# Patient Record
Sex: FEMALE | Race: WHITE | URBAN - METROPOLITAN AREA
[De-identification: names, ages, dates, MRNs, and addresses within clinical notes are randomized per-mention and may not be internally consistent; named-entity substitution may affect disease eponyms.]

---

## 2020-06-01 ENCOUNTER — OFFICE VISIT (OUTPATIENT)
Dept: URBAN - METROPOLITAN AREA CLINIC 98 | Facility: CLINIC | Age: 40
End: 2020-06-01

## 2020-06-22 ENCOUNTER — TELEPHONE ENCOUNTER (OUTPATIENT)
Dept: URBAN - METROPOLITAN AREA CLINIC 92 | Facility: CLINIC | Age: 40
End: 2020-06-22

## 2020-06-22 ENCOUNTER — OFFICE VISIT (OUTPATIENT)
Dept: URBAN - METROPOLITAN AREA CLINIC 98 | Facility: CLINIC | Age: 40
End: 2020-06-22
Payer: COMMERCIAL

## 2020-06-22 DIAGNOSIS — R19.7 DIARRHEA: ICD-10-CM

## 2020-06-22 DIAGNOSIS — K92.1 HEMATOCHEZIA: ICD-10-CM

## 2020-06-22 DIAGNOSIS — K50.80 CROHN'S COLITIS: ICD-10-CM

## 2020-06-22 DIAGNOSIS — K62.89 ANAL PAIN: ICD-10-CM

## 2020-06-22 PROCEDURE — 82306 VITAMIN D 25 HYDROXY: CPT | Performed by: INTERNAL MEDICINE

## 2020-06-22 PROCEDURE — 80299 QUANTITATIVE ASSAY DRUG: CPT | Performed by: INTERNAL MEDICINE

## 2020-06-22 PROCEDURE — 82397 CHEMILUMINESCENT ASSAY: CPT | Performed by: INTERNAL MEDICINE

## 2020-06-22 PROCEDURE — 99215 OFFICE O/P EST HI 40 MIN: CPT | Performed by: INTERNAL MEDICINE

## 2020-06-22 RX ORDER — USTEKINUMAB 90 MG/ML: AS DIRECTED INJECTION, SOLUTION SUBCUTANEOUS

## 2020-06-22 RX ORDER — OMEPRAZOLE 40 MG/1
TAKE 1 CAPSULE (40 MG) BY ORAL ROUTE ONCE DAILY BEFORE A MEAL FOR 90 DAYS CAPSULE, DELAYED RELEASE PELLETS ORAL 1
Qty: 90 | Refills: 3 | Status: ACTIVE | COMMUNITY
Start: 2019-12-02 | End: 2020-11-26

## 2020-06-22 RX ORDER — USTEKINUMAB 90 MG/ML
AS DIRECTED INJECTION, SOLUTION SUBCUTANEOUS
Status: ACTIVE | COMMUNITY

## 2020-06-22 RX ORDER — METHOTREXATE SODIUM 25 MG/ML
INJECT 0.4 MILLILITER ONCE A WEEK FOR 28 DAYS INJECTION, SOLUTION INTRA-ARTERIAL; INTRAMUSCULAR; INTRAVENOUS
Qty: 4 | Refills: 5 | Status: ACTIVE | COMMUNITY
Start: 2020-02-21

## 2020-06-22 RX ORDER — METRONIDAZOLE 500 MG/1
1 TABLET TABLET, FILM COATED ORAL
Qty: 28 TABLET | OUTPATIENT
Start: 2020-06-22 | End: 2020-07-06

## 2020-06-22 RX ORDER — BUDESONIDE 3 MG/1
3 CAPSULES CAPSULE ORAL ONCE A DAY
Status: ACTIVE | COMMUNITY

## 2020-06-22 RX ORDER — BUDESONIDE 3 MG/1: 3 CAPSULES CAPSULE ORAL ONCE A DAY

## 2020-06-22 NOTE — HPI-TODAY'S VISIT:
40 yo female with Crohn's disease comes in for face to face 6 month f/u.  Doing the same as Dec 2.  Tapered off Entocort but is back on  6 mg a day.  Started Stelara Sept 2019. Last dose of Cimzia was July 2019 Colonoscopy 7/15/19 and this showed ulceratioon in multiple areas of the colon.  Currently 5 stools a day, pre- Stelara it was 8 and so there is improvement. Urgency is better. Visible pain is gone.  Soaking in a tub and sitz bath and using hemorrhoid, preparation H.  Might try 2 week course of flagyl 500 bid. Culturelle once a day.  Stelara 90 q 8 weeks Mtx .6 q week folic acid 1 mg  Entocort 2 per day. Omeprazole 40 mg a day Culturelle Psotiasis has resolved with Stelara.  12/2/19  Plan 12/2/19 omeprazole 40 mg oral capsule,delayed release(DR/EC)  SIG: take 1 capsule (40 mg) by oral route once daily before a meal for 90 days  DISP: (90) capsules with 3 refills Prescribed on 12/02/2019    Dexilant 60 mg oral capsule,biphase delayed releas  SIG: take 1 capsule (60 mg) by oral route once daily  DISP: (90) capsules with 1 refills Discontinued on 12/02/2019    Medications have been Reconciled  Transition of Care or Provider Policy  InstructionsI have documented a list of current medications and reviewed it with the patient.  I encouraged the patient to diet and exercise.  Electronically Identified Patient Education Materials Provided Electronically  DispositionReturn To Clinic in 6 Months  CorrespondenceCC this document (Alize Valente MD) - 12/2/2019    3 days of xifaxan given for [place] travel.  Start tapering budesonide from 9 to 6 to 3 mg q month change.         12/2/19 HPI Follow-up Crohn's Disease    History Of Present Illness  This is a scheduled appointment for this patient, a 38 year old /White female, after a previous visit approximately Crohn's disease.     39 yo female with CD and recent Cimzia comes in for f/u.  Doing well.  Stooling is more formed. Rare blood in stool.  Big improvement.  Psoriasis is good.  Soreness around bottom. Has used hemorrhoidal cream. Feels irritated    == 8/26/19  39 yo female Stelara infusion 8/5 Less bleeding and for the first week, stools were more formed but now looser and more tarry. Not sure why.  Not on an acid blocker, never was.  No upper pain, but does have lower spasms that is the same as pre-STelara. Urgency is the same.   ====== 6/24/19   39 yo female with CD last seen 5/15 and last had Cimzia 4 days ago, 6/20 and is taking 2 injections -- 400 mg every 2 weeks.  3 loose and 1 formed stool.  Painful. Formed stool causes lower abd pain. Rectal bleeding with every stool. Low energy  Been on prednisone 25 a day for 2 months.  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  Psoriasis was dx and confirmed by Dr. Buchanan and she did cortisone injection and is just a nidus on head.  Left knee aches.  Discussed options such as Humira, Remicade and Stelara.  Prefers Budesonide over Prednisone.  5/15/19 labs on pred  showed "indeterminate" quantiferon, that we need to recheck.   ==== 5/15/19  39 yo female with CD dx 2007 (initally thought to be UC comes in for 6 month f/u).  Beem pm pred for 5 weeks.  4-5 bloody stools a day.  Urgency.  Never had c diff.  Psotiasis or scaly area on scalp for 4 years, and got better with Cimzia, which was started in 2015.  Cimzia helped CD also, and has escalated to 400 mg q 2 weeks. Mtx .8cc q week. 25mg predniosone   Last dose Cimzia was Monday May 6.  Needs 8 week wash for Julia study which we discussed.  Also stress as she is having divorce in progress.  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      Past Medical History  Disease Name Date Onset Notes  Abdominal pain, generalized 06/24/2019 --   Anal pain 12/02/2019 --   Black tarry stools 08/26/2019 --   Crohn disease unk --   Diarrhea 11/28/2018 --   Hematochezia 05/15/2019 --   Influenza Immunization unk --   Psoriasis 11/15/2018 --       Past Surgical History  Procedure Name Date Notes  Colonoscopy 5/2018 12/02/2019 - 05/15/2019 - 11/28/2018 - 11/15/2018 - 04/11/2018 - 06/17/2016 - 05/06/2016 - 04/11/2016 -   EGD 8/2014 12/02/2019 - 05/15/2019 - 11/28/2018 - 11/15/2018 - 04/11/2018 - 06/17/2016 - 05/06/2016 - 04/11/2016 -       Medication List  Name Date Started Instructions  Dexilant 60 mg oral capsule,biphase delayed releas  take 1 capsule (60 mg) by oral route once daily  Entocort EC 3 mg oral capsule,delayed,extend.release 06/24/2019 take 3 capsules (9 mg) by oral route once daily in the morning for up to 8 weeks for 30 days  folic acid oral  --   methotrexate sodium 25 mg/mL injection solution 11/28/2018 inject 1 milliliter once a week for 28 days  Stelara 90 mg/mL subcutaneous syringe 06/24/2019 inject 1 milliliter (90 mg) by subcutaneous route every 8 weeks for 60 days      Allergy List  Allergen Name Date Reaction Notes  No Known Environmental Allergies --  --  05/06/2016 - 04/11/2016 -   Dairy allergy --  --  05/06/2016 - 04/11/2016 -   mercaptopurine --  --  --   other --  --  Mesalamine based drugs      Family Medical History  Disease Name Relative/Age Notes  Family history of colon polyps Mother/45  5/6/2016  Family history of breast cancer Mother/46  5/6/2016      Social History

## 2020-07-01 LAB
A/G RATIO: 1.3
ALBUMIN: 4
ALKALINE PHOSPHATASE: 69
ALT (SGPT): 13
ANTI-USTEKINUMAB ANTIBODY: <40
AST (SGOT): 13
BASO (ABSOLUTE): 0.1
BASOS: 1
BILIRUBIN, TOTAL: <0.2
BUN/CREATININE RATIO: 11
BUN: 10
C-REACTIVE PROTEIN, QUANT: 19
CALCIUM: 9.3
CARBON DIOXIDE, TOTAL: 19
CHLORIDE: 104
CREATININE: 0.89
EGFR IF AFRICN AM: 94
EGFR IF NONAFRICN AM: 81
EOS (ABSOLUTE): 0.3
EOS: 2
FERRITIN, SERUM: 8
GLOBULIN, TOTAL: 3
GLUCOSE: 102
HEMATOCRIT: 36.4
HEMATOLOGY COMMENTS:: (no result)
HEMOGLOBIN: 11.1
IMMATURE CELLS: (no result)
IMMATURE GRANS (ABS): 0
IMMATURE GRANULOCYTES: 0
IRON BIND.CAP.(TIBC): 406
IRON SATURATION: 12
IRON: 48
LYMPHS (ABSOLUTE): 1.7
LYMPHS: 14
MCH: 26.1
MCHC: 30.5
MCV: 86
MONOCYTES(ABSOLUTE): 0.8
MONOCYTES: 7
NEUTROPHILS (ABSOLUTE): 9.1
NEUTROPHILS: 76
NRBC: (no result)
PLATELETS: 609
POTASSIUM: 4.9
PROTEIN, TOTAL: 7
RBC: 4.25
RDW: 13.8
SEDIMENTATION RATE-WESTERGREN: 33
SODIUM: 144
UIBC: 358
USTEKINUMAB: 3.9
VITAMIN D, 25-HYDROXY: 43.8
WBC: 12

## 2020-08-24 ENCOUNTER — OFFICE VISIT (OUTPATIENT)
Dept: URBAN - METROPOLITAN AREA CLINIC 98 | Facility: CLINIC | Age: 40
End: 2020-08-24
Payer: COMMERCIAL

## 2020-08-24 DIAGNOSIS — K92.1 HEMATOCHEZIA: ICD-10-CM

## 2020-08-24 DIAGNOSIS — R19.7 DIARRHEA: ICD-10-CM

## 2020-08-24 DIAGNOSIS — K50.80 CROHN'S COLITIS: ICD-10-CM

## 2020-08-24 DIAGNOSIS — K62.89 ANAL PAIN: ICD-10-CM

## 2020-08-24 PROCEDURE — 99215 OFFICE O/P EST HI 40 MIN: CPT | Performed by: INTERNAL MEDICINE

## 2020-08-24 RX ORDER — BUDESONIDE 3 MG/1
3 CAPSULES CAPSULE ORAL ONCE A DAY
Status: ACTIVE | COMMUNITY

## 2020-08-24 RX ORDER — USTEKINUMAB 90 MG/ML
AS DIRECTED INJECTION, SOLUTION SUBCUTANEOUS
Status: ACTIVE | COMMUNITY

## 2020-08-24 RX ORDER — METHOTREXATE SODIUM 25 MG/ML
INJECT 0.4 MILLILITER ONCE A WEEK FOR 28 DAYS INJECTION, SOLUTION INTRA-ARTERIAL; INTRAMUSCULAR; INTRAVENOUS
Qty: 4 | Refills: 5 | Status: ACTIVE | COMMUNITY
Start: 2020-02-21

## 2020-08-24 RX ORDER — OMEPRAZOLE 40 MG/1
TAKE 1 CAPSULE (40 MG) BY ORAL ROUTE ONCE DAILY BEFORE A MEAL FOR 90 DAYS CAPSULE, DELAYED RELEASE PELLETS ORAL 1
Qty: 90 | Refills: 3 | Status: ACTIVE | COMMUNITY
Start: 2019-12-02 | End: 2020-11-26

## 2020-08-24 NOTE — HPI-TODAY'S VISIT:
38 yo female with Crohn's disease for telehealth.  Next  4-5 stools a day. Occasional blood in stool Gets pain 2-3 times a week or more Fatigue No joint aches  Psoriasis is inactive.   ==============================  6/22/20  38 yo female with Crohn's disease comes in for face to face 6 month f/u.  Doing the same as Dec 2.  Tapered off Entocort but is back on  6 mg a day.  Started Stelara Sept 2019. Last dose of Cimzia was July 2019 Colonoscopy 7/15/19 and this showed ulceratioon in multiple areas of the colon.  Currently 5 stools a day, pre- Stelara it was 8 and so there is improvement. Urgency is better. Visible blood is gone.  Soaking in a tub and sitz bath and using hemorrhoid, preparation H.  Might try 2 week course of flagyl 500 bid. Culturelle once a day.  Stelara 90 q 8 weeks Mtx .6 q week folic acid 1 mg  Entocort 2 per day. Omeprazole 40 mg a day Culturelle Psotiasis has resolved with Stelara. ================================================================= 12/2/19  Plan 12/2/19 omeprazole 40 mg oral capsule,delayed release(DR/EC)  SIG: take 1 capsule (40 mg) by oral route once daily before a meal for 90 days  DISP: (90) capsules with 3 refills Prescribed on 12/02/2019    Dexilant 60 mg oral capsule,biphase delayed releas  SIG: take 1 capsule (60 mg) by oral route once daily  DISP: (90) capsules with 1 refills Discontinued on 12/02/2019    Medications have been Reconciled  Transition of Care or Provider Policy  InstructionsI have documented a list of current medications and reviewed it with the patient.  I encouraged the patient to diet and exercise.  Electronically Identified Patient Education Materials Provided Electronically  DispositionReturn To Clinic in 6 Months  CorrespondenceCC this document (Alize Valente MD) - 12/2/2019    3 days of xifaxan given for [place] travel.  Start tapering budesonide from 9 to 6 to 3 mg q month change.         12/2/19 HPI Follow-up Crohn's Disease    History Of Present Illness  This is a scheduled appointment for this patient, a 38 year old /White female, after a previous visit approximately Crohn's disease.     37 yo female with CD and recent Cimzia comes in for f/u.  Doing well.  Stooling is more formed. Rare blood in stool.  Big improvement.  Psoriasis is good.  Soreness around bottom. Has used hemorrhoidal cream. Feels irritated    == 8/26/19  37 yo female Stelara infusion 8/5 Less bleeding and for the first week, stools were more formed but now looser and more tarry. Not sure why.  Not on an acid blocker, never was.  No upper pain, but does have lower spasms that is the same as pre-STelara. Urgency is the same.   ====== 6/24/19   37 yo female with CD last seen 5/15 and last had Cimzia 4 days ago, 6/20 and is taking 2 injections -- 400 mg every 2 weeks.  3 loose and 1 formed stool.  Painful. Formed stool causes lower abd pain. Rectal bleeding with every stool. Low energy  Been on prednisone 25 a day for 2 months.  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  Psoriasis was dx and confirmed by Dr. Buchanan and she did cortisone injection and is just a nidus on head.  Left knee aches.  Discussed options such as Humira, Remicade and Stelara.  Prefers Budesonide over Prednisone.  5/15/19 labs on pred  showed "indeterminate" quantiferon, that we need to recheck.   ==== 5/15/19  37 yo female with CD dx 2007 (initally thought to be UC comes in for 6 month f/u).  Beem pm pred for 5 weeks.  4-5 bloody stools a day.  Urgency.  Never had c diff.  Psotiasis or scaly area on scalp for 4 years, and got better with Cimzia, which was started in 2015.  Cimzia helped CD also, and has escalated to 400 mg q 2 weeks. Mtx .8cc q week. 25mg predniosone   Last dose Cimzia was Monday May 6.  Needs 8 week wash for Julia study which we discussed.  Also stress as she is having divorce in progress.  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      Past Medical History  Disease Name Date Onset Notes  Abdominal pain, generalized 06/24/2019 --   Anal pain 12/02/2019 --   Black tarry stools 08/26/2019 --   Crohn disease unk --   Diarrhea 11/28/2018 --   Hematochezia 05/15/2019 --   Influenza Immunization unk --   Psoriasis 11/15/2018 --       Past Surgical History  Procedure Name Date Notes  Colonoscopy 5/2018 12/02/2019 - 05/15/2019 - 11/28/2018 - 11/15/2018 - 04/11/2018 - 06/17/2016 - 05/06/2016 - 04/11/2016 -   EGD 8/2014 12/02/2019 - 05/15/2019 - 11/28/2018 - 11/15/2018 - 04/11/2018 - 06/17/2016 - 05/06/2016 - 04/11/2016 -       Medication List  Name Date Started Instructions  Dexilant 60 mg oral capsule,biphase delayed releas  take 1 capsule (60 mg) by oral route once daily  Entocort EC 3 mg oral capsule,delayed,extend.release 06/24/2019 take 3 capsules (9 mg) by oral route once daily in the morning for up to 8 weeks for 30 days  folic acid oral  --   methotrexate sodium 25 mg/mL injection solution 11/28/2018 inject 1 milliliter once a week for 28 days  Stelara 90 mg/mL subcutaneous syringe 06/24/2019 inject 1 milliliter (90 mg) by subcutaneous route every 8 weeks for 60 days      Allergy List  Allergen Name Date Reaction Notes  No Known Environmental Allergies --  --  05/06/2016 - 04/11/2016 -   Dairy allergy --  --  05/06/2016 - 04/11/2016 -   mercaptopurine --  --  --   other --  --  Mesalamine based drugs      Family Medical History  Disease Name Relative/Age Notes  Family history of colon polyps Mother/45  5/6/2016  Family history of breast cancer Mother/46  5/6/2016      Social History

## 2020-09-04 ENCOUNTER — ERX REFILL RESPONSE (OUTPATIENT)
Age: 40
End: 2020-09-04

## 2020-09-04 RX ORDER — METHOTREXATE 25 MG/ML
INJECT 0.4 MILLILITER ONCE A WEEK FOR 28 DAYS INJECTION INTRA-ARTERIAL; INTRAMUSCULAR; INTRATHECAL; INTRAVENOUS
Qty: 8 | Refills: 2

## 2020-09-26 ENCOUNTER — ERX REFILL RESPONSE (OUTPATIENT)
Age: 40
End: 2020-09-26

## 2020-09-26 RX ORDER — FOLIC ACID 1 MG/1
TAKE 5 TABLETS BY MOUTH ONCE WEEKLY TABLET ORAL
Qty: 60 | Refills: 3

## 2020-09-26 RX ORDER — BUDESONIDE 3 MG/1
TAKE 3 CAPSULES BY MOUTH IN THE MORNING CAPSULE, GELATIN COATED ORAL
Qty: 270 | Refills: 2

## 2020-10-21 ENCOUNTER — TELEPHONE ENCOUNTER (OUTPATIENT)
Dept: URBAN - METROPOLITAN AREA CLINIC 98 | Facility: CLINIC | Age: 40
End: 2020-10-21

## 2020-10-21 RX ORDER — USTEKINUMAB 90 MG/ML
INJECT 90 MG INJECTION, SOLUTION SUBCUTANEOUS
Qty: 1 SYRINGE | Refills: 6 | OUTPATIENT
Start: 2020-10-21 | End: 2021-12-14

## 2020-12-24 ENCOUNTER — ERX REFILL RESPONSE (OUTPATIENT)
Age: 40
End: 2020-12-24

## 2020-12-24 RX ORDER — OMEPRAZOLE 40 MG/1
TAKE 1 CAPSULE BY MOUTH EVERY DAY BEFORE A MEAL CAPSULE, DELAYED RELEASE ORAL
Qty: 90 | Refills: 3

## 2021-03-15 ENCOUNTER — TELEPHONE ENCOUNTER (OUTPATIENT)
Dept: URBAN - METROPOLITAN AREA SURGERY CENTER 30 | Facility: SURGERY CENTER | Age: 41
End: 2021-03-15

## 2021-05-08 ENCOUNTER — ERX REFILL RESPONSE (OUTPATIENT)
Dept: URBAN - METROPOLITAN AREA CLINIC 98 | Facility: CLINIC | Age: 41
End: 2021-05-08

## 2021-05-08 RX ORDER — METHOTREXATE 25 MG/ML
INJECT 0.4 MILLILITER ONCE A WEEK FOR 28 DAYS INJECTION INTRA-ARTERIAL; INTRAMUSCULAR; INTRATHECAL; INTRAVENOUS
Qty: 8 | Refills: 2

## 2021-06-28 ENCOUNTER — OFFICE VISIT (OUTPATIENT)
Dept: URBAN - METROPOLITAN AREA CLINIC 98 | Facility: CLINIC | Age: 41
End: 2021-06-28
Payer: COMMERCIAL

## 2021-06-28 DIAGNOSIS — L40.9 PSORIASIS: ICD-10-CM

## 2021-06-28 DIAGNOSIS — K50.80 CROHN'S COLITIS: ICD-10-CM

## 2021-06-28 DIAGNOSIS — K92.1 HEMATOCHEZIA: ICD-10-CM

## 2021-06-28 DIAGNOSIS — R10.84 ABDOMINAL PAIN, GENERALIZED: ICD-10-CM

## 2021-06-28 PROCEDURE — 99214 OFFICE O/P EST MOD 30 MIN: CPT | Performed by: INTERNAL MEDICINE

## 2021-06-28 RX ORDER — FOLIC ACID 1 MG/1
TAKE 5 TABLETS BY MOUTH ONCE WEEKLY TABLET ORAL
Qty: 60 | Refills: 3 | Status: ACTIVE | COMMUNITY

## 2021-06-28 RX ORDER — USTEKINUMAB 90 MG/ML
INJECT 90 MG INJECTION, SOLUTION SUBCUTANEOUS
Qty: 1 SYRINGE | Refills: 6 | Status: ACTIVE | COMMUNITY
Start: 2020-10-21 | End: 2021-12-14

## 2021-06-28 RX ORDER — METHOTREXATE SODIUM 25 MG/ML
INJECT 0.4 MILLILITER ONCE A WEEK FOR 28 DAYS INJECTION, SOLUTION INTRA-ARTERIAL; INTRAMUSCULAR; INTRAVENOUS
Qty: 4 | Refills: 5 | Status: ACTIVE | COMMUNITY
Start: 2020-02-21

## 2021-06-28 RX ORDER — OMEPRAZOLE 40 MG/1
TAKE 1 CAPSULE BY MOUTH EVERY DAY BEFORE A MEAL CAPSULE, DELAYED RELEASE ORAL
Qty: 90 | Refills: 3 | Status: ACTIVE | COMMUNITY

## 2021-06-28 RX ORDER — BUDESONIDE 3 MG/1
TAKE 3 CAPSULES BY MOUTH IN THE MORNING CAPSULE, GELATIN COATED ORAL
Qty: 270 | Refills: 2 | Status: ACTIVE | COMMUNITY

## 2021-06-28 RX ORDER — METHOTREXATE 25 MG/ML
INJECT 0.4 MILLILITER ONCE A WEEK FOR 28 DAYS INJECTION INTRA-ARTERIAL; INTRAMUSCULAR; INTRATHECAL; INTRAVENOUS
Qty: 8 | Refills: 2 | Status: ACTIVE | COMMUNITY

## 2021-06-28 RX ORDER — USTEKINUMAB 90 MG/ML
AS DIRECTED INJECTION, SOLUTION SUBCUTANEOUS
Status: ACTIVE | COMMUNITY

## 2021-06-28 RX ORDER — BUDESONIDE 3 MG/1
3 CAPSULES CAPSULE ORAL ONCE A DAY
Status: ACTIVE | COMMUNITY

## 2021-06-28 NOTE — HPI-TODAY'S VISIT:
39 yo female for CD f/u. 10 months. Been on Stelara 1.5 years or so.  Doing really well on Stelara.  No diarrhea. No pain. Psoriasis almost inactive. No topical. Occasional blood in stool twice a month.  No other medical issues. Last colonoscopy- summer of 2019. CD duration ----- 2017. Need colonoscopy  in next 4 months. Is taking omeprazole 40 mg a day for heartburn.  Stelara 90 q 8 Mtx .6 cc or 12.5 mg q week Folic acid 1mg 5 per week Omeprazole 40 mg q am.    ============================ 8/24/20  40 yo female with Crohn's disease for telehealth.  Next  4-5 stools a day. Occasional blood in stool Gets pain 2-3 times a week or more Fatigue No joint aches  Psoriasis is inactive. Stelara - tx.   ==============================  6/22/20  40 yo female with Crohn's disease comes in for face to face 6 month f/u.  Doing the same as Dec 2.  Tapered off Entocort but is back on  6 mg a day.  Started Stelara Sept 2019. Last dose of Cimzia was July 2019 Colonoscopy 7/15/19 and this showed ulceratioon in multiple areas of the colon.  Currently 5 stools a day, pre- Stelara it was 8 and so there is improvement. Urgency is better. Visible blood is gone.  Soaking in a tub and sitz bath and using hemorrhoid, preparation H.  Might try 2 week course of flagyl 500 bid. Culturelle once a day.  Stelara 90 q 8 weeks Mtx .6 q week folic acid 1 mg  Entocort 2 per day. Omeprazole 40 mg a day Culturelle Psotiasis has resolved with Stelara. ================================================================= 12/2/19  Plan 12/2/19 omeprazole 40 mg oral capsule,delayed release(DR/EC)  SIG: take 1 capsule (40 mg) by oral route once daily before a meal for 90 days  DISP: (90) capsules with 3 refills Prescribed on 12/02/2019    Dexilant 60 mg oral capsule,biphase delayed releas  SIG: take 1 capsule (60 mg) by oral route once daily  DISP: (90) capsules with 1 refills Discontinued on 12/02/2019    Medications have been Reconciled  Transition of Care or Provider Policy  InstructionsI have documented a list of current medications and reviewed it with the patient.  I encouraged the patient to diet and exercise.  Electronically Identified Patient Education Materials Provided Electronically  DispositionReturn To Clinic in 6 Months  CorrespondenceCC this document (Alize Valente MD) - 12/2/2019    3 days of xifaxan given for [place] travel.  Start tapering budesonide from 9 to 6 to 3 mg q month change.         12/2/19 HPI Follow-up Crohn's Disease    History Of Present Illness  This is a scheduled appointment for this patient, a 38 year old /White female, after a previous visit approximately Crohn's disease.     37 yo female with CD and recent Cimzia comes in for f/u.  Doing well.  Stooling is more formed. Rare blood in stool.  Big improvement.  Psoriasis is good.  Soreness around bottom. Has used hemorrhoidal cream. Feels irritated    == 8/26/19  37 yo female Stelara infusion 8/5 Less bleeding and for the first week, stools were more formed but now looser and more tarry. Not sure why.  Not on an acid blocker, never was.  No upper pain, but does have lower spasms that is the same as pre-STelara. Urgency is the same.   ====== 6/24/19   37 yo female with CD last seen 5/15 and last had Cimzia 4 days ago, 6/20 and is taking 2 injections -- 400 mg every 2 weeks.  3 loose and 1 formed stool.  Painful. Formed stool causes lower abd pain. Rectal bleeding with every stool. Low energy  Been on prednisone 25 a day for 2 months.  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  Psoriasis was dx and confirmed by Dr. Buchanan and she did cortisone injection and is just a nidus on head.  Left knee aches.  Discussed options such as Humira, Remicade and Stelara.  Prefers Budesonide over Prednisone.  5/15/19 labs on pred  showed "indeterminate" quantiferon, that we need to recheck.   ==== 5/15/19  37 yo female with CD dx 2007 (initally thought to be UC comes in for 6 month f/u).  Beem pm pred for 5 weeks.  4-5 bloody stools a day.  Urgency.  Never had c diff.  Psotiasis or scaly area on scalp for 4 years, and got better with Cimzia, which was started in 2015.  Cimzia helped CD also, and has escalated to 400 mg q 2 weeks. Mtx .8cc q week. 25mg predniosone   Last dose Cimzia was Monday May 6.  Needs 8 week wash for Julia study which we discussed.  Also stress as she is having divorce in progress.  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      Past Medical History  Disease Name Date Onset Notes  Abdominal pain, generalized 06/24/2019 --   Anal pain 12/02/2019 --   Black tarry stools 08/26/2019 --   Crohn disease unk --   Diarrhea 11/28/2018 --   Hematochezia 05/15/2019 --   Influenza Immunization unk --   Psoriasis 11/15/2018 --       Past Surgical History  Procedure Name Date Notes  Colonoscopy 5/2018 12/02/2019 - 05/15/2019 - 11/28/2018 - 11/15/2018 - 04/11/2018 - 06/17/2016 - 05/06/2016 - 04/11/2016 -   EGD 8/2014 12/02/2019 - 05/15/2019 - 11/28/2018 - 11/15/2018 - 04/11/2018 - 06/17/2016 - 05/06/2016 - 04/11/2016 -       Medication List  Name Date Started Instructions  Dexilant 60 mg oral capsule,biphase delayed releas  take 1 capsule (60 mg) by oral route once daily  Entocort EC 3 mg oral capsule,delayed,extend.release 06/24/2019 take 3 capsules (9 mg) by oral route once daily in the morning for up to 8 weeks for 30 days  folic acid oral  --   methotrexate sodium 25 mg/mL injection solution 11/28/2018 inject 1 milliliter once a week for 28 days  Stelara 90 mg/mL subcutaneous syringe 06/24/2019 inject 1 milliliter (90 mg) by subcutaneous route every 8 weeks for 60 days      Allergy List  Allergen Name Date Reaction Notes  No Known Environmental Allergies --  --  05/06/2016 - 04/11/2016 -   Dairy allergy --  --  05/06/2016 - 04/11/2016 -   mercaptopurine --  --  --   other --  --  Mesalamine based drugs      Family Medical History  Disease Name Relative/Age Notes  Family history of colon polyps Mother/45  5/6/2016  Family history of breast cancer Mother/46  5/6/2016      Social History

## 2021-08-08 LAB
A/G RATIO: 1.4
ALBUMIN: 4.2
ALKALINE PHOSPHATASE: 81
ALT (SGPT): 15
ANTI-USTEKINUMAB ANTIBODY: <40
AST (SGOT): 17
BASO (ABSOLUTE): 0.1
BASOS: 1
BILIRUBIN, TOTAL: <0.2
BUN/CREATININE RATIO: 8
BUN: 7
C-REACTIVE PROTEIN, QUANT: 12
CALCIUM: 9.5
CARBON DIOXIDE, TOTAL: 21
CHLORIDE: 105
CREATININE: 0.89
EGFR IF AFRICN AM: 93
EGFR IF NONAFRICN AM: 81
EOS (ABSOLUTE): 0.1
EOS: 1
FERRITIN, SERUM: 11
FOLATE (FOLIC ACID), SERUM: >20
GLOBULIN, TOTAL: 3.1
GLUCOSE: 108
HEMATOCRIT: 36.2
HEMATOLOGY COMMENTS:: (no result)
HEMOGLOBIN: 10.9
IMMATURE CELLS: (no result)
IMMATURE GRANS (ABS): 0
IMMATURE GRANULOCYTES: 0
IRON BIND.CAP.(TIBC): 378
IRON SATURATION: 17
IRON: 66
LYMPHS (ABSOLUTE): 1.1
LYMPHS: 13
MCH: 24.9
MCHC: 30.1
MCV: 83
MONOCYTES(ABSOLUTE): 0.3
MONOCYTES: 4
NEUTROPHILS (ABSOLUTE): 6.8
NEUTROPHILS: 81
NRBC: (no result)
PLATELETS: 452
POTASSIUM: 4.3
PROTEIN, TOTAL: 7.3
RBC: 4.37
RDW: 17.9
SEDIMENTATION RATE-WESTERGREN: 30
SODIUM: 139
UIBC: 312
USTEKINUMAB: 4.2
VITAMIN B12: 743
VITAMIN D, 25-HYDROXY: 48.8
WBC: 8.3

## 2021-10-07 ENCOUNTER — OFFICE VISIT (OUTPATIENT)
Dept: URBAN - METROPOLITAN AREA SURGERY CENTER 18 | Facility: SURGERY CENTER | Age: 41
End: 2021-10-07
Payer: COMMERCIAL

## 2021-10-07 ENCOUNTER — CLAIMS CREATED FROM THE CLAIM WINDOW (OUTPATIENT)
Dept: URBAN - METROPOLITAN AREA CLINIC 4 | Facility: CLINIC | Age: 41
End: 2021-10-07
Payer: COMMERCIAL

## 2021-10-07 DIAGNOSIS — K51.80 OTHER ULCERATIVE COLITIS WITHOUT COMPLICATIONS: ICD-10-CM

## 2021-10-07 DIAGNOSIS — K63.5 BENIGN COLON POLYP: ICD-10-CM

## 2021-10-07 DIAGNOSIS — K50.80 CROHN'S COLITIS: ICD-10-CM

## 2021-10-07 DIAGNOSIS — K50.119 CROHN'S DISEASE OF LARGE INTESTINE WITH UNSPECIFIED COMPLICATIONS: ICD-10-CM

## 2021-10-07 DIAGNOSIS — K63.89 POLYP, HYPERPLASTIC: ICD-10-CM

## 2021-10-07 DIAGNOSIS — K51.40 INFLAMMATORY POLYPS OF COLON WITHOUT COMPLICATIONS: ICD-10-CM

## 2021-10-07 PROCEDURE — 45385 COLONOSCOPY W/LESION REMOVAL: CPT | Performed by: INTERNAL MEDICINE

## 2021-10-07 PROCEDURE — 88341 IMHCHEM/IMCYTCHM EA ADD ANTB: CPT | Performed by: PATHOLOGY

## 2021-10-07 PROCEDURE — 45380 COLONOSCOPY AND BIOPSY: CPT | Performed by: INTERNAL MEDICINE

## 2021-10-07 PROCEDURE — 88305 TISSUE EXAM BY PATHOLOGIST: CPT | Performed by: PATHOLOGY

## 2021-10-07 PROCEDURE — 88342 IMHCHEM/IMCYTCHM 1ST ANTB: CPT | Performed by: PATHOLOGY

## 2021-10-07 PROCEDURE — G8907 PT DOC NO EVENTS ON DISCHARG: HCPCS | Performed by: INTERNAL MEDICINE

## 2021-10-07 RX ORDER — BUDESONIDE 3 MG/1
TAKE 3 CAPSULES BY MOUTH IN THE MORNING CAPSULE, GELATIN COATED ORAL
Qty: 270 | Refills: 2 | Status: ACTIVE | COMMUNITY

## 2021-10-07 RX ORDER — BUDESONIDE 3 MG/1
3 CAPSULES CAPSULE ORAL ONCE A DAY
Status: ACTIVE | COMMUNITY

## 2021-10-07 RX ORDER — USTEKINUMAB 90 MG/ML
AS DIRECTED INJECTION, SOLUTION SUBCUTANEOUS
Status: ACTIVE | COMMUNITY

## 2021-10-07 RX ORDER — METHOTREXATE 25 MG/ML
INJECT 0.4 MILLILITER ONCE A WEEK FOR 28 DAYS INJECTION INTRA-ARTERIAL; INTRAMUSCULAR; INTRATHECAL; INTRAVENOUS
Qty: 8 | Refills: 2 | Status: ACTIVE | COMMUNITY

## 2021-10-07 RX ORDER — USTEKINUMAB 90 MG/ML
INJECT 90 MG INJECTION, SOLUTION SUBCUTANEOUS
Qty: 1 SYRINGE | Refills: 6 | Status: ACTIVE | COMMUNITY
Start: 2020-10-21 | End: 2021-12-14

## 2021-10-07 RX ORDER — METHOTREXATE SODIUM 25 MG/ML
INJECT 0.4 MILLILITER ONCE A WEEK FOR 28 DAYS INJECTION, SOLUTION INTRA-ARTERIAL; INTRAMUSCULAR; INTRAVENOUS
Qty: 4 | Refills: 5 | Status: ACTIVE | COMMUNITY
Start: 2020-02-21

## 2021-10-07 RX ORDER — OMEPRAZOLE 40 MG/1
TAKE 1 CAPSULE BY MOUTH EVERY DAY BEFORE A MEAL CAPSULE, DELAYED RELEASE ORAL
Qty: 90 | Refills: 3 | Status: ACTIVE | COMMUNITY

## 2021-10-07 RX ORDER — FOLIC ACID 1 MG/1
TAKE 5 TABLETS BY MOUTH ONCE WEEKLY TABLET ORAL
Qty: 60 | Refills: 3 | Status: ACTIVE | COMMUNITY

## 2021-10-21 ENCOUNTER — ERX REFILL RESPONSE (OUTPATIENT)
Dept: URBAN - METROPOLITAN AREA CLINIC 98 | Facility: CLINIC | Age: 41
End: 2021-10-21

## 2021-10-21 RX ORDER — USTEKINUMAB 90 MG/ML
AS DIRECTED INJECTION, SOLUTION SUBCUTANEOUS
OUTPATIENT

## 2021-10-21 RX ORDER — USTEKINUMAB 90 MG/ML
INJECT 90 MG UNDER THE SKIN EVERY 8 WEEKS INJECTION, SOLUTION SUBCUTANEOUS
Qty: 1 MILLILITER | Refills: 6 | OUTPATIENT

## 2021-11-06 ENCOUNTER — ERX REFILL RESPONSE (OUTPATIENT)
Dept: URBAN - METROPOLITAN AREA CLINIC 98 | Facility: CLINIC | Age: 41
End: 2021-11-06

## 2021-11-06 RX ORDER — METHOTREXATE 25 MG/ML
INJECT 0.4 MILLILITER ONCE A WEEK INJECTION INTRA-ARTERIAL; INTRAMUSCULAR; INTRATHECAL; INTRAVENOUS
Qty: 8 MILLILITER | Refills: 3 | OUTPATIENT

## 2021-11-06 RX ORDER — METHOTREXATE 25 MG/ML
INJECT 0.4 MILLILITER ONCE A WEEK FOR 28 DAYS INJECTION INTRA-ARTERIAL; INTRAMUSCULAR; INTRATHECAL; INTRAVENOUS
Qty: 8 | Refills: 2 | OUTPATIENT

## 2022-01-07 ENCOUNTER — ERX REFILL RESPONSE (OUTPATIENT)
Dept: URBAN - METROPOLITAN AREA CLINIC 98 | Facility: CLINIC | Age: 42
End: 2022-01-07

## 2022-01-07 RX ORDER — FOLIC ACID 1 MG/1
TAKE 5 TABLETS BY MOUTH ONCE WEEKLY TABLET ORAL
Qty: 60 TABLET | Refills: 4 | OUTPATIENT

## 2022-01-07 RX ORDER — FOLIC ACID 1 MG/1
TAKE 5 TABLETS BY MOUTH ONCE WEEKLY TABLET ORAL
Qty: 60 | Refills: 3 | OUTPATIENT

## 2022-01-10 ENCOUNTER — OFFICE VISIT (OUTPATIENT)
Dept: URBAN - METROPOLITAN AREA CLINIC 98 | Facility: CLINIC | Age: 42
End: 2022-01-10
Payer: COMMERCIAL

## 2022-01-10 ENCOUNTER — TELEPHONE ENCOUNTER (OUTPATIENT)
Dept: URBAN - METROPOLITAN AREA CLINIC 98 | Facility: CLINIC | Age: 42
End: 2022-01-10

## 2022-01-10 DIAGNOSIS — K92.1 HEMATOCHEZIA: ICD-10-CM

## 2022-01-10 DIAGNOSIS — K92.1 BLACK TARRY STOOLS: ICD-10-CM

## 2022-01-10 DIAGNOSIS — R19.7 DIARRHEA: ICD-10-CM

## 2022-01-10 DIAGNOSIS — K50.80 CROHN'S COLITIS: ICD-10-CM

## 2022-01-10 PROCEDURE — 99214 OFFICE O/P EST MOD 30 MIN: CPT | Performed by: INTERNAL MEDICINE

## 2022-01-10 RX ORDER — USTEKINUMAB 90 MG/ML
INJECT 90 MG INJECTION, SOLUTION SUBCUTANEOUS
Qty: 1 SYRINGE | Refills: 6

## 2022-01-10 RX ORDER — OMEPRAZOLE 40 MG/1
TAKE 1 CAPSULE BY MOUTH EVERY DAY BEFORE A MEAL CAPSULE, DELAYED RELEASE ORAL
Qty: 90 | Refills: 3 | Status: ACTIVE | COMMUNITY

## 2022-01-10 RX ORDER — USTEKINUMAB 90 MG/ML
INJECT 90 MG UNDER THE SKIN EVERY 8 WEEKS INJECTION, SOLUTION SUBCUTANEOUS
Qty: 1 MILLILITER | Refills: 6 | Status: ACTIVE | COMMUNITY

## 2022-01-10 RX ORDER — BUDESONIDE 3 MG/1
TAKE 3 CAPSULES BY MOUTH IN THE MORNING CAPSULE, GELATIN COATED ORAL
Qty: 270 | Refills: 2 | Status: ACTIVE | COMMUNITY

## 2022-01-10 RX ORDER — BUDESONIDE 3 MG/1
3 CAPSULES CAPSULE ORAL ONCE A DAY
Status: ACTIVE | COMMUNITY

## 2022-01-10 RX ORDER — METHOTREXATE 25 MG/ML
INJECT 0.4 MILLILITER ONCE A WEEK INJECTION INTRA-ARTERIAL; INTRAMUSCULAR; INTRATHECAL; INTRAVENOUS
Qty: 8 MILLILITER | Refills: 3 | Status: ACTIVE | COMMUNITY

## 2022-01-10 RX ORDER — FOLIC ACID 1 MG/1
TAKE 5 TABLETS BY MOUTH ONCE WEEKLY TABLET ORAL
Qty: 60 TABLET | Refills: 4 | Status: ACTIVE | COMMUNITY

## 2022-01-10 RX ORDER — METHOTREXATE SODIUM 25 MG/ML
INJECT 0.4 MILLILITER ONCE A WEEK FOR 28 DAYS INJECTION, SOLUTION INTRA-ARTERIAL; INTRAMUSCULAR; INTRAVENOUS
Qty: 4 | Refills: 5 | Status: ACTIVE | COMMUNITY
Start: 2020-02-21

## 2022-01-10 NOTE — HPI-TODAY'S VISIT:
42 yo female with CD dx 2017 on Stelara 90 mg sq q 8 weeks and mtx, now reduced from .6 to .4 per week.  15 mg decreased to 10 mg sq q weeks for the past 6 months or so.  Next dose in 2 days. Insurance approval Gertrude - got a robo call 3 days ago,   No abd pain. No diarrhea. Energy is good. Sometimes has an increase in blood just before next dose, but no such syx today. No joint aches. No psoriasis.  Asyx.  Colonoscopy 10/7/21 showed rectal inflammation visually and on bx, sigmoid ulceration visually, and only inflammation on bx, pancolitis-inactive, and left sided pseudopolyps.  So plan to do TDM today and ?????modify dosing. Not enough syx for POWER study.  ======================== 6/28/21  41 yo female for CD f/u. 10 months. Been on Stelara 1.5 years or so.  Doing really well on Stelara.  No diarrhea. No pain. Psoriasis almost inactive. No topical. Occasional blood in stool twice a month.  No other medical issues. Last colonoscopy- summer of 2019. CD duration ----- 2017. Need colonoscopy  in next 4 months. Is taking omeprazole 40 mg a day for heartburn.  Stelara 90 q 8 Mtx .6 cc or 12.5 mg q week Folic acid 1mg 5 per week Omeprazole 40 mg q am.    ============================ 8/24/20  38 yo female with Crohn's disease for telehealth.  Next  4-5 stools a day. Occasional blood in stool Gets pain 2-3 times a week or more Fatigue No joint aches  Psoriasis is inactive. Stelara - tx.   ==============================  6/22/20  38 yo female with Crohn's disease comes in for face to face 6 month f/u.  Doing the same as Dec 2.  Tapered off Entocort but is back on  6 mg a day.  Started Stelara Sept 2019. Last dose of Cimzia was July 2019 Colonoscopy 7/15/19 and this showed ulceratioon in multiple areas of the colon.  Currently 5 stools a day, pre- Stelara it was 8 and so there is improvement. Urgency is better. Visible blood is gone.  Soaking in a tub and sitz bath and using hemorrhoid, preparation H.  Might try 2 week course of flagyl 500 bid. Culturelle once a day.  Stelara 90 q 8 weeks Mtx .6 q week folic acid 1 mg  Entocort 2 per day. Omeprazole 40 mg a day Culturelle Psotiasis has resolved with Stelara. ================================================================= 12/2/19  Plan 12/2/19 omeprazole 40 mg oral capsule,delayed release(DR/EC)  SIG: take 1 capsule (40 mg) by oral route once daily before a meal for 90 days  DISP: (90) capsules with 3 refills Prescribed on 12/02/2019    Dexilant 60 mg oral capsule,biphase delayed releas  SIG: take 1 capsule (60 mg) by oral route once daily  DISP: (90) capsules with 1 refills Discontinued on 12/02/2019    Medications have been Reconciled  Transition of Care or Provider Policy  InstructionsI have documented a list of current medications and reviewed it with the patient.  I encouraged the patient to diet and exercise.  Electronically Identified Patient Education Materials Provided Electronically  DispositionReturn To Clinic in 6 Months  CorrespondenceCC this document (Alize Valente MD) - 12/2/2019    3 days of xifaxan given for [place] travel.  Start tapering budesonide from 9 to 6 to 3 mg q month change.         12/2/19 HPI Follow-up Crohn's Disease    History Of Present Illness  This is a scheduled appointment for this patient, a 38 year old /White female, after a previous visit approximately Crohn's disease.     39 yo female with CD and recent Cimzia comes in for f/u.  Doing well.  Stooling is more formed. Rare blood in stool.  Big improvement.  Psoriasis is good.  Soreness around bottom. Has used hemorrhoidal cream. Feels irritated    == 8/26/19  39 yo female Stelara infusion 8/5 Less bleeding and for the first week, stools were more formed but now looser and more tarry. Not sure why.  Not on an acid blocker, never was.  No upper pain, but does have lower spasms that is the same as pre-STelara. Urgency is the same.   ====== 6/24/19   39 yo female with CD last seen 5/15 and last had Cimzia 4 days ago, 6/20 and is taking 2 injections -- 400 mg every 2 weeks.  3 loose and 1 formed stool.  Painful. Formed stool causes lower abd pain. Rectal bleeding with every stool. Low energy  Been on prednisone 25 a day for 2 months.  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  Psoriasis was dx and confirmed by Dr. Buchanan and she did cortisone injection and is just a nidus on head.  Left knee aches.  Discussed options such as Humira, Remicade and Stelara.  Prefers Budesonide over Prednisone.  5/15/19 labs on pred  showed "indeterminate" quantiferon, that we need to recheck.   ==== 5/15/19  39 yo female with CD dx 2007 (initally thought to be UC comes in for 6 month f/u).  Beem pm pred for 5 weeks.  4-5 bloody stools a day.  Urgency.  Never had c diff.  Psotiasis or scaly area on scalp for 4 years, and got better with Cimzia, which was started in 2015.  Cimzia helped CD also, and has escalated to 400 mg q 2 weeks. Mtx .8cc q week. 25mg predniosone   Last dose Cimzia was Monday May 6.  Needs 8 week wash for Julia study which we discussed.  Also stress as she is having divorce in progress.  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      Past Medical History  Disease Name Date Onset Notes  Abdominal pain, generalized 06/24/2019 --   Anal pain 12/02/2019 --   Black tarry stools 08/26/2019 --   Crohn disease unk --   Diarrhea 11/28/2018 --   Hematochezia 05/15/2019 --   Influenza Immunization unk --   Psoriasis 11/15/2018 --       Past Surgical History  Procedure Name Date Notes  Colonoscopy 5/2018 12/02/2019 - 05/15/2019 - 11/28/2018 - 11/15/2018 - 04/11/2018 - 06/17/2016 - 05/06/2016 - 04/11/2016 -   EGD 8/2014 12/02/2019 - 05/15/2019 - 11/28/2018 - 11/15/2018 - 04/11/2018 - 06/17/2016 - 05/06/2016 - 04/11/2016 -       Medication List  Name Date Started Instructions  Dexilant 60 mg oral capsule,biphase delayed releas  take 1 capsule (60 mg) by oral route once daily  Entocort EC 3 mg oral capsule,delayed,extend.release 06/24/2019 take 3 capsules (9 mg) by oral route once daily in the morning for up to 8 weeks for 30 days  folic acid oral  --   methotrexate sodium 25 mg/mL injection solution 11/28/2018 inject 1 milliliter once a week for 28 days  Stelara 90 mg/mL subcutaneous syringe 06/24/2019 inject 1 milliliter (90 mg) by subcutaneous route every 8 weeks for 60 days      Allergy List  Allergen Name Date Reaction Notes  No Known Environmental Allergies --  --  05/06/2016 - 04/11/2016 -   Dairy allergy --  --  05/06/2016 - 04/11/2016 -   mercaptopurine --  --  --   other --  --  Mesalamine based drugs      Family Medical History  Disease Name Relative/Age Notes  Family history of colon polyps Mother/45  5/6/2016  Family history of breast cancer Mother/46  5/6/2016      Social History

## 2022-01-13 ENCOUNTER — ERX REFILL RESPONSE (OUTPATIENT)
Dept: URBAN - METROPOLITAN AREA CLINIC 98 | Facility: CLINIC | Age: 42
End: 2022-01-13

## 2022-01-13 RX ORDER — FOLIC ACID 1 MG/1
TAKE 5 TABLETS BY MOUTH ONCE WEEKLY TABLET ORAL
Qty: 60 TABLET | Refills: 4 | OUTPATIENT

## 2022-01-17 LAB
A/G RATIO: 1.4
ALBUMIN: 4.1
ALKALINE PHOSPHATASE: 86
ALT (SGPT): 17
ANTI-USTEKINUMAB ANTIBODY: <40
AST (SGOT): 20
BASO (ABSOLUTE): 0.1
BASOS: 1
BILIRUBIN, TOTAL: <0.2
BUN/CREATININE RATIO: 11
BUN: 9
C-REACTIVE PROTEIN, QUANT: 20
CALCIUM: 9.5
CARBON DIOXIDE, TOTAL: 21
CHLORIDE: 105
CREATININE: 0.81
EGFR IF AFRICN AM: 104
EGFR IF NONAFRICN AM: 90
EOS (ABSOLUTE): 0.1
EOS: 1
FERRITIN, SERUM: 16
FOLATE (FOLIC ACID), SERUM: >20
GLOBULIN, TOTAL: 3
GLUCOSE: 95
HEMATOCRIT: 38.6
HEMATOLOGY COMMENTS:: (no result)
HEMOGLOBIN: 12.1
IMMATURE CELLS: (no result)
IMMATURE GRANS (ABS): 0
IMMATURE GRANULOCYTES: 0
IRON BIND.CAP.(TIBC): 359
IRON SATURATION: 41
IRON: 147
LYMPHS (ABSOLUTE): 1.5
LYMPHS: 18
MCH: 27.4
MCHC: 31.3
MCV: 88
MONOCYTES(ABSOLUTE): 0.7
MONOCYTES: 8
NEUTROPHILS (ABSOLUTE): 6.1
NEUTROPHILS: 72
NRBC: (no result)
PLATELETS: 473
POTASSIUM: 4.4
PROTEIN, TOTAL: 7.1
RBC: 4.41
RDW: 15.3
SEDIMENTATION RATE-WESTERGREN: 63
SODIUM: 141
UIBC: 212
USTEKINUMAB: 1.9
VITAMIN B12: 1689
VITAMIN D, 25-HYDROXY: 46.3
WBC: 8.6

## 2022-01-20 ENCOUNTER — TELEPHONE ENCOUNTER (OUTPATIENT)
Dept: URBAN - METROPOLITAN AREA CLINIC 98 | Facility: CLINIC | Age: 42
End: 2022-01-20

## 2022-01-20 RX ORDER — USTEKINUMAB 90 MG/ML
INJECT 90 MG INJECTION, SOLUTION SUBCUTANEOUS
Qty: 1 SYRINGE | Refills: 6

## 2022-01-22 ENCOUNTER — ERX REFILL RESPONSE (OUTPATIENT)
Dept: URBAN - METROPOLITAN AREA CLINIC 98 | Facility: CLINIC | Age: 42
End: 2022-01-22

## 2022-01-22 RX ORDER — OMEPRAZOLE 40 MG/1
TAKE 1 CAPSULE BY MOUTH EVERY DAY BEFORE A MEAL CAPSULE, DELAYED RELEASE ORAL
Qty: 90 CAPSULE | Refills: 4 | OUTPATIENT

## 2022-01-22 RX ORDER — OMEPRAZOLE 40 MG/1
TAKE 1 CAPSULE BY MOUTH EVERY DAY BEFORE A MEAL CAPSULE, DELAYED RELEASE ORAL
Qty: 90 | Refills: 3 | OUTPATIENT

## 2022-01-31 ENCOUNTER — LAB OUTSIDE AN ENCOUNTER (OUTPATIENT)
Dept: URBAN - METROPOLITAN AREA CLINIC 98 | Facility: CLINIC | Age: 42
End: 2022-01-31

## 2022-02-04 ENCOUNTER — ERX REFILL RESPONSE (OUTPATIENT)
Dept: URBAN - METROPOLITAN AREA CLINIC 98 | Facility: CLINIC | Age: 42
End: 2022-02-04

## 2022-02-04 LAB
CALPROTECTIN, FECAL: 842
LACTOFERRIN, FECAL, QUANT.: 90.88

## 2022-02-04 RX ORDER — FOLIC ACID 1 MG/1
TAKE 5 TABLETS BY MOUTH ONCE WEEKLY TABLET ORAL
Qty: 60 TABLET | Refills: 4 | OUTPATIENT

## 2022-03-09 ENCOUNTER — WEB ENCOUNTER (OUTPATIENT)
Dept: URBAN - METROPOLITAN AREA CLINIC 98 | Facility: CLINIC | Age: 42
End: 2022-03-09

## 2022-03-09 ENCOUNTER — OFFICE VISIT (OUTPATIENT)
Dept: URBAN - METROPOLITAN AREA CLINIC 98 | Facility: CLINIC | Age: 42
End: 2022-03-09
Payer: COMMERCIAL

## 2022-03-09 DIAGNOSIS — K62.89 ANAL PAIN: ICD-10-CM

## 2022-03-09 DIAGNOSIS — K50.80 CROHN'S COLITIS: ICD-10-CM

## 2022-03-09 DIAGNOSIS — K92.1 HEMATOCHEZIA: ICD-10-CM

## 2022-03-09 DIAGNOSIS — L40.9 PSORIASIS: ICD-10-CM

## 2022-03-09 DIAGNOSIS — R19.7 DIARRHEA: ICD-10-CM

## 2022-03-09 DIAGNOSIS — R10.84 ABDOMINAL PAIN, GENERALIZED: ICD-10-CM

## 2022-03-09 PROCEDURE — 99214 OFFICE O/P EST MOD 30 MIN: CPT | Performed by: INTERNAL MEDICINE

## 2022-03-09 RX ORDER — FOLIC ACID 1 MG/1
TAKE 5 TABLETS BY MOUTH ONCE WEEKLY TABLET ORAL
Qty: 60 TABLET | Refills: 4 | Status: ACTIVE | COMMUNITY

## 2022-03-09 RX ORDER — METHOTREXATE 25 MG/ML
INJECT 0.4 MILLILITER ONCE A WEEK INJECTION INTRA-ARTERIAL; INTRAMUSCULAR; INTRATHECAL; INTRAVENOUS
Qty: 8 MILLILITER | Refills: 3 | Status: ACTIVE | COMMUNITY

## 2022-03-09 RX ORDER — BUDESONIDE 3 MG/1
TAKE 3 CAPSULES BY MOUTH IN THE MORNING CAPSULE, GELATIN COATED ORAL
Qty: 270 | Refills: 2 | Status: ACTIVE | COMMUNITY

## 2022-03-09 RX ORDER — USTEKINUMAB 130 MG/26ML
AS DIRECTED SOLUTION INTRAVENOUS ONCE
Qty: 520 MILLIGRAMS | Refills: 0 | OUTPATIENT
Start: 2022-03-09 | End: 2022-03-10

## 2022-03-09 RX ORDER — BUDESONIDE 3 MG/1
3 CAPSULES CAPSULE ORAL ONCE A DAY
Status: ACTIVE | COMMUNITY

## 2022-03-09 RX ORDER — USTEKINUMAB 90 MG/ML
INJECT 90 MG INJECTION, SOLUTION SUBCUTANEOUS
Qty: 1 SYRINGE | Refills: 6 | Status: ACTIVE | COMMUNITY

## 2022-03-09 RX ORDER — METHOTREXATE SODIUM 25 MG/ML
INJECT 0.4 MILLILITER ONCE A WEEK FOR 28 DAYS INJECTION, SOLUTION INTRA-ARTERIAL; INTRAMUSCULAR; INTRAVENOUS
Qty: 4 | Refills: 5 | Status: ACTIVE | COMMUNITY
Start: 2020-02-21

## 2022-03-09 RX ORDER — OMEPRAZOLE 40 MG/1
TAKE 1 CAPSULE BY MOUTH EVERY DAY BEFORE A MEAL CAPSULE, DELAYED RELEASE ORAL
Qty: 90 CAPSULE | Refills: 4 | Status: ACTIVE | COMMUNITY

## 2022-03-09 NOTE — PHYSICAL EXAM CHEST:
no lesions, no deformities, no traumatic injuries, no significant scars are present, chest wall non-tender, no masses present, tactile fremitus is normal, breathing is unlabored without accessory muscle use., normal breath sounds. Imaging Studies/Medications

## 2022-03-09 NOTE — HPI-TODAY'S VISIT:
40 yo female dx with CD 2017 and UC 2007 comes in for 2 month interval f/u.  STelara 90 q 8w Methotrexate .4 mg q week Folic acid  Last dose was 1/12 and TDM showed 1.7 uste trough.  no pain. no bleeding no diarrhea energy good. Tiny area of scalp psoriasis.  Will try getting Stelara approved iv, get trough again and lab    ======================================== 1/10/22  40 yo female with CD dx 2017 on Stelara 90 mg sq q 8 weeks and mtx, now reduced from .6 to .4 per week.  15 mg decreased to 10 mg sq q weeks for the past 6 months or so.  Next dose in 2 days. Insurance approval [employer] - got a robo call 3 days ago,   No abd pain. No diarrhea. Energy is good. Sometimes has an increase in blood just before next dose, but no such syx today. No joint aches. No psoriasis.  Asyx.  Colonoscopy 10/7/21 showed rectal inflammation visually and on bx, sigmoid ulceration visually, and only inflammation on bx, pancolitis-inactive, and left sided pseudopolyps.  So plan to do TDM today and ?????modify dosing. Not enough syx for POWER study.  ======================== 6/28/21  39 yo female for CD f/u. 10 months. Been on Stelara 1.5 years or so.  Doing really well on Stelara.  No diarrhea. No pain. Psoriasis almost inactive. No topical. Occasional blood in stool twice a month.  No other medical issues. Last colonoscopy- summer of 2019. CD duration ----- 2017. Need colonoscopy  in next 4 months. Is taking omeprazole 40 mg a day for heartburn.  Stelara 90 q 8 Mtx .6 cc or 12.5 mg q week Folic acid 1mg 5 per week Omeprazole 40 mg q am.    ============================ 8/24/20  38 yo female with Crohn's disease for telehealth.  Next  4-5 stools a day. Occasional blood in stool Gets pain 2-3 times a week or more Fatigue No joint aches  Psoriasis is inactive. Stelara - tx.   ==============================  6/22/20  38 yo female with Crohn's disease comes in for face to face 6 month f/u.  Doing the same as Dec 2.  Tapered off Entocort but is back on  6 mg a day.  Started Stelara Sept 2019. Last dose of Cimzia was July 2019 Colonoscopy 7/15/19 and this showed ulceratioon in multiple areas of the colon.  Currently 5 stools a day, pre- Stelara it was 8 and so there is improvement. Urgency is better. Visible blood is gone.  Soaking in a tub and sitz bath and using hemorrhoid, preparation H.  Might try 2 week course of flagyl 500 bid. Culturelle once a day.  Stelara 90 q 8 weeks Mtx .6 q week folic acid 1 mg  Entocort 2 per day. Omeprazole 40 mg a day Culturelle Psotiasis has resolved with Stelara. ================================================================= 12/2/19  Plan 12/2/19 omeprazole 40 mg oral capsule,delayed release(DR/EC)  SIG: take 1 capsule (40 mg) by oral route once daily before a meal for 90 days  DISP: (90) capsules with 3 refills Prescribed on 12/02/2019    Dexilant 60 mg oral capsule,biphase delayed releas  SIG: take 1 capsule (60 mg) by oral route once daily  DISP: (90) capsules with 1 refills Discontinued on 12/02/2019    Medications have been Reconciled  Transition of Care or Provider Policy  InstructionsI have documented a list of current medications and reviewed it with the patient.  I encouraged the patient to diet and exercise.  Electronically Identified Patient Education Materials Provided Electronically  DispositionReturn To Clinic in 6 Months  CorrespondenceCC this document (Alize Valente MD) - 12/2/2019    3 days of xifaxan given for [place] travel.  Start tapering budesonide from 9 to 6 to 3 mg q month change.         12/2/19 HPI Follow-up Crohn's Disease    History Of Present Illness  This is a scheduled appointment for this patient, a 38 year old /White female, after a previous visit approximately Crohn's disease.     37 yo female with CD and recent Cimzia comes in for f/u.  Doing well.  Stooling is more formed. Rare blood in stool.  Big improvement.  Psoriasis is good.  Soreness around bottom. Has used hemorrhoidal cream. Feels irritated    == 8/26/19  37 yo female Stelara infusion 8/5 Less bleeding and for the first week, stools were more formed but now looser and more tarry. Not sure why.  Not on an acid blocker, never was.  No upper pain, but does have lower spasms that is the same as pre-STelara. Urgency is the same.   ====== 6/24/19   37 yo female with CD last seen 5/15 and last had Cimzia 4 days ago, 6/20 and is taking 2 injections -- 400 mg every 2 weeks.  3 loose and 1 formed stool.  Painful. Formed stool causes lower abd pain. Rectal bleeding with every stool. Low energy  Been on prednisone 25 a day for 2 months.  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  Psoriasis was dx and confirmed by Dr. Buchanan and she did cortisone injection and is just a nidus on head.  Left knee aches.  Discussed options such as Humira, Remicade and Stelara.  Prefers Budesonide over Prednisone.  5/15/19 labs on pred  showed "indeterminate" quantiferon, that we need to recheck.   ==== 5/15/19  37 yo female with CD dx 2007 (initally thought to be UC comes in for 6 month f/u).  Beem pm pred for 5 weeks.  4-5 bloody stools a day.  Urgency.  Never had c diff.  Psotiasis or scaly area on scalp for 4 years, and got better with Cimzia, which was started in 2015.  Cimzia helped CD also, and has escalated to 400 mg q 2 weeks. Mtx .8cc q week. 25mg predniosone   Last dose Cimzia was Monday May 6.  Needs 8 week wash for Julia study which we discussed.  Also stress as she is having divorce in progress.  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      Past Medical History  Disease Name Date Onset Notes  Abdominal pain, generalized 06/24/2019 --   Anal pain 12/02/2019 --   Black tarry stools 08/26/2019 --   Crohn disease unk --   Diarrhea 11/28/2018 --   Hematochezia 05/15/2019 --   Influenza Immunization unk --   Psoriasis 11/15/2018 --       Past Surgical History  Procedure Name Date Notes  Colonoscopy 5/2018 12/02/2019 - 05/15/2019 - 11/28/2018 - 11/15/2018 - 04/11/2018 - 06/17/2016 - 05/06/2016 - 04/11/2016 -   EGD 8/2014 12/02/2019 - 05/15/2019 - 11/28/2018 - 11/15/2018 - 04/11/2018 - 06/17/2016 - 05/06/2016 - 04/11/2016 -       Medication List  Name Date Started Instructions  Dexilant 60 mg oral capsule,biphase delayed releas  take 1 capsule (60 mg) by oral route once daily  Entocort EC 3 mg oral capsule,delayed,extend.release 06/24/2019 take 3 capsules (9 mg) by oral route once daily in the morning for up to 8 weeks for 30 days  folic acid oral  --   methotrexate sodium 25 mg/mL injection solution 11/28/2018 inject 1 milliliter once a week for 28 days  Stelara 90 mg/mL subcutaneous syringe 06/24/2019 inject 1 milliliter (90 mg) by subcutaneous route every 8 weeks for 60 days      Allergy List  Allergen Name Date Reaction Notes  No Known Environmental Allergies --  --  05/06/2016 - 04/11/2016 -   Dairy allergy --  --  05/06/2016 - 04/11/2016 -   mercaptopurine --  --  --   other --  --  Mesalamine based drugs      Family Medical History  Disease Name Relative/Age Notes  Family history of colon polyps Mother/45  5/6/2016  Family history of breast cancer Mother/46  5/6/2016      Social History

## 2022-03-16 LAB
A/G RATIO: 1.5
ALBUMIN: 4.5
ALKALINE PHOSPHATASE: 87
ALT (SGPT): 22
AST (SGOT): 19
BASO (ABSOLUTE): 0.1
BASOS: 2
BILIRUBIN, TOTAL: <0.2
BUN/CREATININE RATIO: 11
BUN: 10
C-REACTIVE PROTEIN, QUANT: 17
CALCIUM: 9.7
CARBON DIOXIDE, TOTAL: 20
CHLORIDE: 103
CREATININE: 0.93
EGFR: 79
EOS (ABSOLUTE): 0.5
EOS: 5
GLOBULIN, TOTAL: 3
GLUCOSE: 114
HBSAG SCREEN: NEGATIVE
HEMATOCRIT: 42.1
HEMATOLOGY COMMENTS:: (no result)
HEMOGLOBIN: 13.3
HEP B SURFACE AB, QUAL: REACTIVE
IMMATURE CELLS: (no result)
IMMATURE GRANS (ABS): 0
IMMATURE GRANULOCYTES: 0
LYMPHS (ABSOLUTE): 1.8
LYMPHS: 21
MCH: 28.2
MCHC: 31.6
MCV: 89
MONOCYTES(ABSOLUTE): 0.4
MONOCYTES: 4
NEUTROPHILS (ABSOLUTE): 6
NEUTROPHILS: 68
NRBC: (no result)
PLATELETS: 456
POTASSIUM: 4.4
PROTEIN, TOTAL: 7.5
QUANTIFERON CRITERIA: (no result)
QUANTIFERON INCUBATION: (no result)
QUANTIFERON MITOGEN VALUE: >10
QUANTIFERON NIL VALUE: 0.01
QUANTIFERON TB1 AG VALUE: 0.02
QUANTIFERON TB2 AG VALUE: 0.04
QUANTIFERON-TB GOLD PLUS: NEGATIVE
RBC: 4.72
RDW: 14.9
SEDIMENTATION RATE-WESTERGREN: 41
SODIUM: 142
WBC: 8.8

## 2022-04-11 ENCOUNTER — CLAIMS CREATED FROM THE CLAIM WINDOW (OUTPATIENT)
Dept: URBAN - METROPOLITAN AREA CLINIC 97 | Facility: CLINIC | Age: 42
End: 2022-04-11
Payer: COMMERCIAL

## 2022-04-11 DIAGNOSIS — K50.80 CROHN'S COLITIS: ICD-10-CM

## 2022-04-11 PROCEDURE — 96413 CHEMO IV INFUSION 1 HR: CPT | Performed by: INTERNAL MEDICINE

## 2022-04-11 RX ORDER — OMEPRAZOLE 40 MG/1
TAKE 1 CAPSULE BY MOUTH EVERY DAY BEFORE A MEAL CAPSULE, DELAYED RELEASE ORAL
Qty: 90 CAPSULE | Refills: 4 | Status: ACTIVE | COMMUNITY

## 2022-04-11 RX ORDER — BUDESONIDE 3 MG/1
3 CAPSULES CAPSULE ORAL ONCE A DAY
Status: ACTIVE | COMMUNITY

## 2022-04-11 RX ORDER — METHOTREXATE SODIUM 25 MG/ML
INJECT 0.4 MILLILITER ONCE A WEEK FOR 28 DAYS INJECTION, SOLUTION INTRA-ARTERIAL; INTRAMUSCULAR; INTRAVENOUS
Qty: 4 | Refills: 5 | Status: ACTIVE | COMMUNITY
Start: 2020-02-21

## 2022-04-11 RX ORDER — METHOTREXATE 25 MG/ML
INJECT 0.4 MILLILITER ONCE A WEEK INJECTION INTRA-ARTERIAL; INTRAMUSCULAR; INTRATHECAL; INTRAVENOUS
Qty: 8 MILLILITER | Refills: 3 | Status: ACTIVE | COMMUNITY

## 2022-04-11 RX ORDER — BUDESONIDE 3 MG/1
TAKE 3 CAPSULES BY MOUTH IN THE MORNING CAPSULE, GELATIN COATED ORAL
Qty: 270 | Refills: 2 | Status: ACTIVE | COMMUNITY

## 2022-04-11 RX ORDER — USTEKINUMAB 90 MG/ML
INJECT 90 MG INJECTION, SOLUTION SUBCUTANEOUS
Qty: 1 SYRINGE | Refills: 6 | Status: ACTIVE | COMMUNITY

## 2022-04-11 RX ORDER — FOLIC ACID 1 MG/1
TAKE 5 TABLETS BY MOUTH ONCE WEEKLY TABLET ORAL
Qty: 60 TABLET | Refills: 4 | Status: ACTIVE | COMMUNITY

## 2022-05-09 ENCOUNTER — OFFICE VISIT (OUTPATIENT)
Dept: URBAN - METROPOLITAN AREA CLINIC 98 | Facility: CLINIC | Age: 42
End: 2022-05-09
Payer: COMMERCIAL

## 2022-05-09 DIAGNOSIS — K62.89 ANAL PAIN: ICD-10-CM

## 2022-05-09 DIAGNOSIS — K92.1 HEMATOCHEZIA: ICD-10-CM

## 2022-05-09 DIAGNOSIS — K50.80 CROHN'S COLITIS: ICD-10-CM

## 2022-05-09 DIAGNOSIS — L40.9 PSORIASIS: ICD-10-CM

## 2022-05-09 DIAGNOSIS — R10.84 ABDOMINAL PAIN, GENERALIZED: ICD-10-CM

## 2022-05-09 DIAGNOSIS — R19.7 DIARRHEA: ICD-10-CM

## 2022-05-09 PROCEDURE — 99214 OFFICE O/P EST MOD 30 MIN: CPT | Performed by: INTERNAL MEDICINE

## 2022-05-09 RX ORDER — USTEKINUMAB 90 MG/ML
INJECT 90 MG INJECTION, SOLUTION SUBCUTANEOUS
Qty: 1 SYRINGE | Refills: 6 | Status: ACTIVE | COMMUNITY

## 2022-05-09 RX ORDER — METHOTREXATE 25 MG/ML
INJECT 0.4 MILLILITER ONCE A WEEK INJECTION INTRA-ARTERIAL; INTRAMUSCULAR; INTRATHECAL; INTRAVENOUS
Qty: 8 MILLILITER | Refills: 3 | Status: ACTIVE | COMMUNITY

## 2022-05-09 RX ORDER — METHOTREXATE SODIUM 25 MG/ML
INJECT 0.4 MILLILITER ONCE A WEEK FOR 28 DAYS INJECTION, SOLUTION INTRA-ARTERIAL; INTRAMUSCULAR; INTRAVENOUS
Qty: 4 | Refills: 5 | Status: ACTIVE | COMMUNITY
Start: 2020-02-21

## 2022-05-09 RX ORDER — BUDESONIDE 3 MG/1
3 CAPSULES CAPSULE ORAL ONCE A DAY
Status: ACTIVE | COMMUNITY

## 2022-05-09 RX ORDER — FOLIC ACID 1 MG/1
TAKE 5 TABLETS BY MOUTH ONCE WEEKLY TABLET ORAL
Qty: 60 TABLET | Refills: 4 | Status: ACTIVE | COMMUNITY

## 2022-05-09 RX ORDER — BUDESONIDE 3 MG/1
TAKE 3 CAPSULES BY MOUTH IN THE MORNING CAPSULE, GELATIN COATED ORAL
Qty: 270 | Refills: 2 | Status: ACTIVE | COMMUNITY

## 2022-05-09 RX ORDER — OMEPRAZOLE 40 MG/1
TAKE 1 CAPSULE BY MOUTH EVERY DAY BEFORE A MEAL CAPSULE, DELAYED RELEASE ORAL
Qty: 90 CAPSULE | Refills: 4 | Status: ACTIVE | COMMUNITY

## 2022-05-09 NOTE — HPI-TODAY'S VISIT:
40 yo female comes in for 2 adrianne f/u. Had re-induction on 4/11/22. Was asyx but was done on basis of low Stelara level and elevated ESR and CRp.  also have tried to stop mtx .4 cc q week. Last dose 4/5/22  Stool frequency or other changes none. Maybe softer.  Getting Stelara every 8 weeks. Next dose is June - sq.  6/6.  Will order trough labs for October- about 6 months after re-induction, and f/u after that.    \========================= 3/9/22  40 yo female dx with CD 2017 and UC 2007 comes in for 2 month interval f/u.  STelara 90 q 8w Methotrexate .4 cc q week Folic acid  Last dose was 1/12 and TDM showed 1.7 uste trough.  no pain. no bleeding no diarrhea energy good. Tiny area of scalp psoriasis.  Will try getting Stelara approved, get trough again and lab    ======================================== 1/10/22  40 yo female with CD dx 2017 on Stelara 90 mg sq q 8 weeks and mtx, now reduced from .6 to .4 per week.  15 mg decreased to 10 mg sq q weeks for the past 6 months or so.  Next dose in 2 days. Insurance approval [employer] - got a robo call 3 days ago,   No abd pain. No diarrhea. Energy is good. Sometimes has an increase in blood just before next dose, but no such syx today. No joint aches. No psoriasis.  Asyx.  Colonoscopy 10/7/21 showed rectal inflammation visually and on bx, sigmoid ulceration visually, and only inflammation on bx, pancolitis-inactive, and left sided pseudopolyps.  So plan to do TDM today and ?????modify dosing. Not enough syx for POWER study.  ======================== 6/28/21  41 yo female for CD f/u. 10 months. Been on Stelara 1.5 years or so.  Doing really well on Stelara.  No diarrhea. No pain. Psoriasis almost inactive. No topical. Occasional blood in stool twice a month.  No other medical issues. Last colonoscopy- summer of 2019. CD duration ----- 2017. Need colonoscopy  in next 4 months. Is taking omeprazole 40 mg a day for heartburn.  Stelara 90 q 8 Mtx .6 cc or 12.5 mg q week Folic acid 1mg 5 per week Omeprazole 40 mg q am.    ============================ 8/24/20  38 yo female with Crohn's disease for telehealth.  Next  4-5 stools a day. Occasional blood in stool Gets pain 2-3 times a week or more Fatigue No joint aches  Psoriasis is inactive. Stelara - tx.   ==============================  6/22/20  38 yo female with Crohn's disease comes in for face to face 6 month f/u.  Doing the same as Dec 2.  Tapered off Entocort but is back on  6 mg a day.  Started Stelara Sept 2019. Last dose of Cimzia was July 2019 Colonoscopy 7/15/19 and this showed ulceratioon in multiple areas of the colon.  Currently 5 stools a day, pre- Stelara it was 8 and so there is improvement. Urgency is better. Visible blood is gone.  Soaking in a tub and sitz bath and using hemorrhoid, preparation H.  Might try 2 week course of flagyl 500 bid. Culturelle once a day.  Stelara 90 q 8 weeks Mtx .6 q week folic acid 1 mg  Entocort 2 per day. Omeprazole 40 mg a day Culturelle Psotiasis has resolved with Stelara. ================================================================= 12/2/19  Plan 12/2/19 omeprazole 40 mg oral capsule,delayed release(DR/EC)  SIG: take 1 capsule (40 mg) by oral route once daily before a meal for 90 days  DISP: (90) capsules with 3 refills Prescribed on 12/02/2019    Dexilant 60 mg oral capsule,biphase delayed releas  SIG: take 1 capsule (60 mg) by oral route once daily  DISP: (90) capsules with 1 refills Discontinued on 12/02/2019    Medications have been Reconciled  Transition of Care or Provider Policy  InstructionsI have documented a list of current medications and reviewed it with the patient.  I encouraged the patient to diet and exercise.  Electronically Identified Patient Education Materials Provided Electronically  DispositionReturn To Clinic in 6 Months  CorrespondenceCC this document (Alize Valente MD) - 12/2/2019    3 days of xifaxan given for [place] travel.  Start tapering budesonide from 9 to 6 to 3 mg q month change.         12/2/19 HPI Follow-up Crohn's Disease    History Of Present Illness  This is a scheduled appointment for this patient, a 38 year old /White female, after a previous visit approximately Crohn's disease.     39 yo female with CD and recent Cimzia comes in for f/u.  Doing well.  Stooling is more formed. Rare blood in stool.  Big improvement.  Psoriasis is good.  Soreness around bottom. Has used hemorrhoidal cream. Feels irritated    == 8/26/19  39 yo female Stelara infusion 8/5 Less bleeding and for the first week, stools were more formed but now looser and more tarry. Not sure why.  Not on an acid blocker, never was.  No upper pain, but does have lower spasms that is the same as pre-STelara. Urgency is the same.   ====== 6/24/19   39 yo female with CD last seen 5/15 and last had Cimzia 4 days ago, 6/20 and is taking 2 injections -- 400 mg every 2 weeks.  3 loose and 1 formed stool.  Painful. Formed stool causes lower abd pain. Rectal bleeding with every stool. Low energy  Been on prednisone 25 a day for 2 months.  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  Psoriasis was dx and confirmed by Dr. Buchanan and she did cortisone injection and is just a nidus on head.  Left knee aches.  Discussed options such as Humira, Remicade and Stelara.  Prefers Budesonide over Prednisone.  5/15/19 labs on pred  showed "indeterminate" quantiferon, that we need to recheck.   ==== 5/15/19  39 yo female with CD dx 2007 (initally thought to be UC comes in for 6 month f/u).  Beem pm pred for 5 weeks.  4-5 bloody stools a day.  Urgency.  Never had c diff.  Psotiasis or scaly area on scalp for 4 years, and got better with Cimzia, which was started in 2015.  Cimzia helped CD also, and has escalated to 400 mg q 2 weeks. Mtx .8cc q week. 25mg predniosone   Last dose Cimzia was Monday May 6.  Needs 8 week wash for Julia study which we discussed.  Also stress as she is having divorce in progress.  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      Past Medical History  Disease Name Date Onset Notes  Abdominal pain, generalized 06/24/2019 --   Anal pain 12/02/2019 --   Black tarry stools 08/26/2019 --   Crohn disease unk --   Diarrhea 11/28/2018 --   Hematochezia 05/15/2019 --   Influenza Immunization unk --   Psoriasis 11/15/2018 --       Past Surgical History  Procedure Name Date Notes  Colonoscopy 5/2018 12/02/2019 - 05/15/2019 - 11/28/2018 - 11/15/2018 - 04/11/2018 - 06/17/2016 - 05/06/2016 - 04/11/2016 -   EGD 8/2014 12/02/2019 - 05/15/2019 - 11/28/2018 - 11/15/2018 - 04/11/2018 - 06/17/2016 - 05/06/2016 - 04/11/2016 -       Medication List  Name Date Started Instructions  Dexilant 60 mg oral capsule,biphase delayed releas  take 1 capsule (60 mg) by oral route once daily  Entocort EC 3 mg oral capsule,delayed,extend.release 06/24/2019 take 3 capsules (9 mg) by oral route once daily in the morning for up to 8 weeks for 30 days  folic acid oral  --   methotrexate sodium 25 mg/mL injection solution 11/28/2018 inject 1 milliliter once a week for 28 days  Stelara 90 mg/mL subcutaneous syringe 06/24/2019 inject 1 milliliter (90 mg) by subcutaneous route every 8 weeks for 60 days      Allergy List  Allergen Name Date Reaction Notes  No Known Environmental Allergies --  --  05/06/2016 - 04/11/2016 -   Dairy allergy --  --  05/06/2016 - 04/11/2016 -   mercaptopurine --  --  --   other --  --  Mesalamine based drugs      Family Medical History  Disease Name Relative/Age Notes  Family history of colon polyps Mother/45  5/6/2016  Family history of breast cancer Mother/46  5/6/2016      Social History

## 2022-05-10 LAB
A/G RATIO: 1.3
ALBUMIN: 3.9
ALKALINE PHOSPHATASE: 76
ALT (SGPT): 9
AST (SGOT): 12
BASO (ABSOLUTE): 0.2
BASOS: 2
BILIRUBIN, TOTAL: <0.2
BUN/CREATININE RATIO: 10
BUN: 10
C-REACTIVE PROTEIN, QUANT: 16
CALCIUM: 9.4
CARBON DIOXIDE, TOTAL: 24
CHLORIDE: 104
CREATININE: 0.97
EGFR: 75
EOS (ABSOLUTE): 0.6
EOS: 5
GLOBULIN, TOTAL: 3
GLUCOSE: 116
HEMATOCRIT: 40.4
HEMATOLOGY COMMENTS:: (no result)
HEMOGLOBIN: 12.6
IMMATURE CELLS: (no result)
IMMATURE GRANS (ABS): 0
IMMATURE GRANULOCYTES: 0
LYMPHS (ABSOLUTE): 2.1
LYMPHS: 20
MCH: 28.1
MCHC: 31.2
MCV: 90
MONOCYTES(ABSOLUTE): 0.7
MONOCYTES: 7
NEUTROPHILS (ABSOLUTE): 7
NEUTROPHILS: 66
NRBC: (no result)
PLATELETS: 448
POTASSIUM: 4.6
PROTEIN, TOTAL: 6.9
RBC: 4.48
RDW: 14.2
SEDIMENTATION RATE-WESTERGREN: 26
SODIUM: 142
WBC: 10.6

## 2022-05-18 ENCOUNTER — P2P PATIENT RECORD (OUTPATIENT)
Age: 42
End: 2022-05-18

## 2022-09-19 ENCOUNTER — OFFICE VISIT (OUTPATIENT)
Dept: URBAN - METROPOLITAN AREA CLINIC 98 | Facility: CLINIC | Age: 42
End: 2022-09-19

## 2022-09-19 VITALS — HEIGHT: 67 IN | BODY MASS INDEX: 36.57 KG/M2 | WEIGHT: 233 LBS

## 2022-09-19 RX ORDER — USTEKINUMAB 90 MG/ML
INJECT 90 MG INJECTION, SOLUTION SUBCUTANEOUS
Qty: 1 SYRINGE | Refills: 6 | Status: ACTIVE | COMMUNITY

## 2022-09-19 RX ORDER — BUDESONIDE 3 MG/1
TAKE 3 CAPSULES BY MOUTH IN THE MORNING CAPSULE, GELATIN COATED ORAL
Qty: 270 | Refills: 2 | Status: ACTIVE | COMMUNITY

## 2022-09-19 RX ORDER — OMEPRAZOLE 40 MG/1
TAKE 1 CAPSULE BY MOUTH EVERY DAY BEFORE A MEAL CAPSULE, DELAYED RELEASE ORAL
Qty: 90 CAPSULE | Refills: 4 | Status: ACTIVE | COMMUNITY

## 2022-09-19 RX ORDER — BUDESONIDE 3 MG/1
3 CAPSULES CAPSULE ORAL ONCE A DAY
Status: ACTIVE | COMMUNITY

## 2022-09-19 RX ORDER — METHOTREXATE 25 MG/ML
INJECT 0.4 MILLILITER ONCE A WEEK INJECTION INTRA-ARTERIAL; INTRAMUSCULAR; INTRATHECAL; INTRAVENOUS
Qty: 8 MILLILITER | Refills: 3 | Status: ACTIVE | COMMUNITY

## 2022-09-19 RX ORDER — FOLIC ACID 1 MG/1
TAKE 5 TABLETS BY MOUTH ONCE WEEKLY TABLET ORAL
Qty: 60 TABLET | Refills: 4 | Status: ACTIVE | COMMUNITY

## 2022-09-19 RX ORDER — METHOTREXATE SODIUM 25 MG/ML
INJECT 0.4 MILLILITER ONCE A WEEK FOR 28 DAYS INJECTION, SOLUTION INTRA-ARTERIAL; INTRAMUSCULAR; INTRAVENOUS
Qty: 4 | Refills: 5 | Status: ACTIVE | COMMUNITY
Start: 2020-02-21

## 2022-09-26 ENCOUNTER — LAB OUTSIDE AN ENCOUNTER (OUTPATIENT)
Dept: URBAN - METROPOLITAN AREA CLINIC 98 | Facility: CLINIC | Age: 42
End: 2022-09-26

## 2022-09-28 ENCOUNTER — OFFICE VISIT (OUTPATIENT)
Dept: URBAN - METROPOLITAN AREA CLINIC 98 | Facility: CLINIC | Age: 42
End: 2022-09-28
Payer: COMMERCIAL

## 2022-09-28 VITALS
WEIGHT: 229 LBS | BODY MASS INDEX: 35.94 KG/M2 | HEIGHT: 67 IN | SYSTOLIC BLOOD PRESSURE: 120 MMHG | HEART RATE: 92 BPM | TEMPERATURE: 97.4 F | DIASTOLIC BLOOD PRESSURE: 81 MMHG

## 2022-09-28 DIAGNOSIS — K92.1 BLACK TARRY STOOLS: ICD-10-CM

## 2022-09-28 DIAGNOSIS — K62.89 ANAL PAIN: ICD-10-CM

## 2022-09-28 DIAGNOSIS — L40.9 PSORIASIS: ICD-10-CM

## 2022-09-28 DIAGNOSIS — K50.90 CROHN DISEASE: ICD-10-CM

## 2022-09-28 DIAGNOSIS — R10.84 ABDOMINAL PAIN, GENERALIZED: ICD-10-CM

## 2022-09-28 DIAGNOSIS — R19.7 DIARRHEA: ICD-10-CM

## 2022-09-28 DIAGNOSIS — K92.1 HEMATOCHEZIA: ICD-10-CM

## 2022-09-28 DIAGNOSIS — K50.80 CROHN'S COLITIS: ICD-10-CM

## 2022-09-28 PROCEDURE — 99214 OFFICE O/P EST MOD 30 MIN: CPT | Performed by: INTERNAL MEDICINE

## 2022-09-28 RX ORDER — BUDESONIDE 3 MG/1
TAKE 3 CAPSULES BY MOUTH IN THE MORNING CAPSULE, GELATIN COATED ORAL
Qty: 270 | Refills: 2 | Status: ACTIVE | COMMUNITY

## 2022-09-28 RX ORDER — BUDESONIDE 3 MG/1
3 CAPSULES CAPSULE ORAL ONCE A DAY
Status: ACTIVE | COMMUNITY

## 2022-09-28 RX ORDER — OMEPRAZOLE 40 MG/1
TAKE 1 CAPSULE BY MOUTH EVERY DAY BEFORE A MEAL CAPSULE, DELAYED RELEASE ORAL
Qty: 90 CAPSULE | Refills: 4 | Status: ACTIVE | COMMUNITY

## 2022-09-28 RX ORDER — METHOTREXATE 25 MG/ML
INJECT 0.4 MILLILITER ONCE A WEEK INJECTION INTRA-ARTERIAL; INTRAMUSCULAR; INTRATHECAL; INTRAVENOUS
Qty: 8 MILLILITER | Refills: 3 | Status: ACTIVE | COMMUNITY

## 2022-09-28 RX ORDER — METHOTREXATE SODIUM 25 MG/ML
INJECT 0.4 MILLILITER ONCE A WEEK FOR 28 DAYS INJECTION, SOLUTION INTRA-ARTERIAL; INTRAMUSCULAR; INTRAVENOUS
Qty: 4 | Refills: 5 | Status: ACTIVE | COMMUNITY
Start: 2020-02-21

## 2022-09-28 RX ORDER — FOLIC ACID 1 MG/1
TAKE 5 TABLETS BY MOUTH ONCE WEEKLY TABLET ORAL
Qty: 60 TABLET | Refills: 4 | Status: ACTIVE | COMMUNITY

## 2022-09-28 RX ORDER — USTEKINUMAB 90 MG/ML
INJECT 90 MG INJECTION, SOLUTION SUBCUTANEOUS
Qty: 1 SYRINGE | Refills: 6 | Status: ACTIVE | COMMUNITY

## 2022-09-28 NOTE — HPI-TODAY'S VISIT:
40 yo female with CD comes in for 5 month f/u. Doing well. Had Stelara re-induction on 4/11/22 and it helped her low trough level. She was not having much in the way of syx and she was having 4 stools a day and now 2 or 3.  no pain or bleeding. No joint or psoriasis.  Blood labs done 2 days ago.  Stelara has helped much more than Cimzia and she is in clinical remission. Last colonoscopy was 10/7/21 and showed left colon ulceration. Needs a f/u- decided on Feb 2023.   On Omep so EGD too.  Methotrexate d/c after last visit 10 mg sq q week --- and has done fine off it. ======================== 5/9/22  40 yo female comes in for 2 adrianne f/u. Had re-induction on 4/11/22. Was asyx but was done on basis of low Stelara level and elevated ESR and CRp.  also have tried to stop mtx .4 cc q week. Last dose 4/5/22  Stool frequency or other changes none. Maybe softer.  Getting Stelara every 8 weeks. Next dose is June - sq.  6/6.  Will order trough labs for October- about 6 months after re-induction, and f/u after that.    \========================= 3/9/22  40 yo female dx with CD 2017 and UC 2007 comes in for 2 month interval f/u.  STelara 90 q 8w Methotrexate .4 cc q week Folic acid  Last dose was 1/12 and TDM showed 1.7 uste trough.  no pain. no bleeding no diarrhea energy good. Tiny area of scalp psoriasis.  Will try getting Stelara approved, get trough again and lab    ======================================== 1/10/22  40 yo female with CD dx 2017 on Stelara 90 mg sq q 8 weeks and mtx, now reduced from .6 to .4 per week.  15 mg decreased to 10 mg sq q weeks for the past 6 months or so.  Next dose in 2 days. Insurance approval [employer] - got a robo call 3 days ago,   No abd pain. No diarrhea. Energy is good. Sometimes has an increase in blood just before next dose, but no such syx today. No joint aches. No psoriasis.  Asyx.  Colonoscopy 10/7/21 showed rectal inflammation visually and on bx, sigmoid ulceration visually, and only inflammation on bx, pancolitis-inactive, and left sided pseudopolyps.  So plan to do TDM today and ?????modify dosing. Not enough syx for POWER study.  ======================== 6/28/21  39 yo female for CD f/u. 10 months. Been on Stelara 1.5 years or so.  Doing really well on Stelara.  No diarrhea. No pain. Psoriasis almost inactive. No topical. Occasional blood in stool twice a month.  No other medical issues. Last colonoscopy- summer of 2019. CD duration ----- 2017. Need colonoscopy  in next 4 months. Is taking omeprazole 40 mg a day for heartburn.  Stelara 90 q 8 Mtx .6 cc or 12.5 mg q week Folic acid 1mg 5 per week Omeprazole 40 mg q am.    ============================ 8/24/20  38 yo female with Crohn's disease for telehealth.  Next  4-5 stools a day. Occasional blood in stool Gets pain 2-3 times a week or more Fatigue No joint aches  Psoriasis is inactive. Stelara - tx.   ==============================  6/22/20  38 yo female with Crohn's disease comes in for face to face 6 month f/u.  Doing the same as Dec 2.  Tapered off Entocort but is back on  6 mg a day.  Started Stelara Sept 2019. Last dose of Cimzia was July 2019 Colonoscopy 7/15/19 and this showed ulceratioon in multiple areas of the colon.  Currently 5 stools a day, pre- Stelara it was 8 and so there is improvement. Urgency is better. Visible blood is gone.  Soaking in a tub and sitz bath and using hemorrhoid, preparation H.  Might try 2 week course of flagyl 500 bid. Culturelle once a day.  Stelara 90 q 8 weeks Mtx .6 q week folic acid 1 mg  Entocort 2 per day. Omeprazole 40 mg a day Culturelle Psotiasis has resolved with Stelara. ================================================================= 12/2/19  Plan 12/2/19 omeprazole 40 mg oral capsule,delayed release(DR/EC)  SIG: take 1 capsule (40 mg) by oral route once daily before a meal for 90 days  DISP: (90) capsules with 3 refills Prescribed on 12/02/2019    Dexilant 60 mg oral capsule,biphase delayed releas  SIG: take 1 capsule (60 mg) by oral route once daily  DISP: (90) capsules with 1 refills Discontinued on 12/02/2019    Medications have been Reconciled  Transition of Care or Provider Policy  InstructionsI have documented a list of current medications and reviewed it with the patient.  I encouraged the patient to diet and exercise.  Electronically Identified Patient Education Materials Provided Electronically  DispositionReturn To Clinic in 6 Months  CorrespondenceCC this document (Alize Valente MD) - 12/2/2019    3 days of xifaxan given for [place] travel.  Start tapering budesonide from 9 to 6 to 3 mg q month change.         12/2/19 HPI Follow-up Crohn's Disease    History Of Present Illness  This is a scheduled appointment for this patient, a 38 year old /White female, after a previous visit approximately Crohn's disease.     37 yo female with CD and recent Cimzia comes in for f/u.  Doing well.  Stooling is more formed. Rare blood in stool.  Big improvement.  Psoriasis is good.  Soreness around bottom. Has used hemorrhoidal cream. Feels irritated    == 8/26/19  37 yo female Stelara infusion 8/5 Less bleeding and for the first week, stools were more formed but now looser and more tarry. Not sure why.  Not on an acid blocker, never was.  No upper pain, but does have lower spasms that is the same as pre-STelara. Urgency is the same.   ====== 6/24/19   37 yo female with CD last seen 5/15 and last had Cimzia 4 days ago, 6/20 and is taking 2 injections -- 400 mg every 2 weeks.  3 loose and 1 formed stool.  Painful. Formed stool causes lower abd pain. Rectal bleeding with every stool. Low energy  Been on prednisone 25 a day for 2 months.  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  Psoriasis was dx and confirmed by Dr. Buchanan and she did cortisone injection and is just a nidus on head.  Left knee aches.  Discussed options such as Humira, Remicade and Stelara.  Prefers Budesonide over Prednisone.  5/15/19 labs on pred  showed "indeterminate" quantiferon, that we need to recheck.   ==== 5/15/19  37 yo female with CD dx 2007 (initally thought to be UC comes in for 6 month f/u).  Beem pm pred for 5 weeks.  4-5 bloody stools a day.  Urgency.  Never had c diff.  Psotiasis or scaly area on scalp for 4 years, and got better with Cimzia, which was started in 2015.  Cimzia helped CD also, and has escalated to 400 mg q 2 weeks. Mtx .8cc q week. 25mg predniosone   Last dose Cimzia was Monday May 6.  Needs 8 week wash for Julia study which we discussed.  Also stress as she is having divorce in progress.  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      Past Medical History  Disease Name Date Onset Notes  Abdominal pain, generalized 06/24/2019 --   Anal pain 12/02/2019 --   Black tarry stools 08/26/2019 --   Crohn disease unk --   Diarrhea 11/28/2018 --   Hematochezia 05/15/2019 --   Influenza Immunization unk --   Psoriasis 11/15/2018 --       Past Surgical History  Procedure Name Date Notes  Colonoscopy 5/2018 12/02/2019 - 05/15/2019 - 11/28/2018 - 11/15/2018 - 04/11/2018 - 06/17/2016 - 05/06/2016 - 04/11/2016 -   EGD 8/2014 12/02/2019 - 05/15/2019 - 11/28/2018 - 11/15/2018 - 04/11/2018 - 06/17/2016 - 05/06/2016 - 04/11/2016 -       Medication List  Name Date Started Instructions  Dexilant 60 mg oral capsule,biphase delayed releas  take 1 capsule (60 mg) by oral route once daily  Entocort EC 3 mg oral capsule,delayed,extend.release 06/24/2019 take 3 capsules (9 mg) by oral route once daily in the morning for up to 8 weeks for 30 days  folic acid oral  --   methotrexate sodium 25 mg/mL injection solution 11/28/2018 inject 1 milliliter once a week for 28 days  Stelara 90 mg/mL subcutaneous syringe 06/24/2019 inject 1 milliliter (90 mg) by subcutaneous route every 8 weeks for 60 days      Allergy List  Allergen Name Date Reaction Notes  No Known Environmental Allergies --  --  05/06/2016 - 04/11/2016 -   Dairy allergy --  --  05/06/2016 - 04/11/2016 -   mercaptopurine --  --  --   other --  --  Mesalamine based drugs      Family Medical History  Disease Name Relative/Age Notes  Family history of colon polyps Mother/45  5/6/2016  Family history of breast cancer Mother/46  5/6/2016      Social History

## 2022-10-08 LAB
A/G RATIO: 1.5
ALBUMIN: 4.3
ALKALINE PHOSPHATASE: 74
ALT (SGPT): 24
ANTI-USTEKINUMAB ANTIBODY: <40
AST (SGOT): 22
BASO (ABSOLUTE): 0.1
BASOS: 1
BILIRUBIN, TOTAL: 0.2
BUN/CREATININE RATIO: 12
BUN: 12
C-REACTIVE PROTEIN, QUANT: 16
CALCIUM: 9.2
CARBON DIOXIDE, TOTAL: 21
CHLORIDE: 103
CREATININE: 0.99
EGFR: 73
EOS (ABSOLUTE): 0.3
EOS: 4
FERRITIN, SERUM: 39
FOLATE (FOLIC ACID), SERUM: >20
GLOBULIN, TOTAL: 2.8
GLUCOSE: 104
HEMATOCRIT: 41.2
HEMATOLOGY COMMENTS:: (no result)
HEMOGLOBIN: 13.3
IMMATURE CELLS: (no result)
IMMATURE GRANS (ABS): 0
IMMATURE GRANULOCYTES: 0
IRON BIND.CAP.(TIBC): 277
IRON SATURATION: 37
IRON: 102
LYMPHS (ABSOLUTE): 1.8
LYMPHS: 24
MCH: 29.3
MCHC: 32.3
MCV: 91
MONOCYTES(ABSOLUTE): 0.4
MONOCYTES: 5
NEUTROPHILS (ABSOLUTE): 5
NEUTROPHILS: 66
NRBC: (no result)
PLATELETS: 419
POTASSIUM: 4.3
PROTEIN, TOTAL: 7.1
RBC: 4.54
RDW: 12.7
SEDIMENTATION RATE-WESTERGREN: 10
SODIUM: 140
UIBC: 175
USTEKINUMAB: 2.1
VITAMIN B12: 1375
WBC: 7.5

## 2022-11-10 PROBLEM — 71833008 CROHN'S DISEASE OF SMALL AND LARGE INTESTINES: Status: ACTIVE | Noted: 2022-11-10

## 2022-12-28 ENCOUNTER — ERX REFILL RESPONSE (OUTPATIENT)
Dept: URBAN - METROPOLITAN AREA CLINIC 98 | Facility: CLINIC | Age: 42
End: 2022-12-28

## 2022-12-28 RX ORDER — USTEKINUMAB 90 MG/ML
INJECT 90 MG INJECTION, SOLUTION SUBCUTANEOUS
Qty: 1 SYRINGE | Refills: 6 | OUTPATIENT

## 2022-12-28 RX ORDER — USTEKINUMAB 90 MG/ML
INJECT 90 MG UNDER THE SKIN EVERY 8 WEEKS INJECTION, SOLUTION SUBCUTANEOUS
Qty: 1 MILLILITER | Refills: 0 | OUTPATIENT

## 2023-01-20 PROBLEM — 698065002 ACID REFLUX: Status: ACTIVE | Noted: 2023-01-20

## 2023-02-15 ENCOUNTER — ERX REFILL RESPONSE (OUTPATIENT)
Dept: URBAN - METROPOLITAN AREA CLINIC 98 | Facility: CLINIC | Age: 43
End: 2023-02-15

## 2023-02-15 RX ORDER — OMEPRAZOLE 40 MG/1
TAKE 1 CAPSULE BY MOUTH EVERY DAY BEFORE MEALS CAPSULE, DELAYED RELEASE ORAL
Qty: 30 CAPSULE | Refills: 11 | OUTPATIENT

## 2023-02-15 RX ORDER — OMEPRAZOLE 40 MG/1
TAKE 1 CAPSULE BY MOUTH EVERY DAY BEFORE A MEAL CAPSULE, DELAYED RELEASE ORAL
Qty: 90 CAPSULE | Refills: 4 | OUTPATIENT

## 2023-02-16 ENCOUNTER — TELEPHONE ENCOUNTER (OUTPATIENT)
Dept: URBAN - METROPOLITAN AREA CLINIC 98 | Facility: CLINIC | Age: 43
End: 2023-02-16

## 2023-02-16 RX ORDER — USTEKINUMAB 90 MG/ML
INJECT 90 MG INJECTION, SOLUTION SUBCUTANEOUS
Qty: 1 EACH | Refills: 8

## 2023-03-02 ENCOUNTER — LAB OUTSIDE AN ENCOUNTER (OUTPATIENT)
Dept: URBAN - METROPOLITAN AREA CLINIC 98 | Facility: CLINIC | Age: 43
End: 2023-03-02

## 2023-03-10 LAB
CALPROTECTIN, FECAL: 91
LACTOFERRIN, FECAL, QUANT.: 5.21

## 2023-03-21 ENCOUNTER — CLAIMS CREATED FROM THE CLAIM WINDOW (OUTPATIENT)
Dept: URBAN - METROPOLITAN AREA CLINIC 4 | Facility: CLINIC | Age: 43
End: 2023-03-21
Payer: COMMERCIAL

## 2023-03-21 ENCOUNTER — CLAIMS CREATED FROM THE CLAIM WINDOW (OUTPATIENT)
Dept: URBAN - METROPOLITAN AREA SURGERY CENTER 18 | Facility: SURGERY CENTER | Age: 43
End: 2023-03-21
Payer: COMMERCIAL

## 2023-03-21 ENCOUNTER — OFFICE VISIT (OUTPATIENT)
Dept: URBAN - METROPOLITAN AREA SURGERY CENTER 18 | Facility: SURGERY CENTER | Age: 43
End: 2023-03-21

## 2023-03-21 DIAGNOSIS — K21.9 GASTRO-ESOPHAGEAL REFLUX DISEASE WITHOUT ESOPHAGITIS: ICD-10-CM

## 2023-03-21 DIAGNOSIS — K31.89 OTHER DISEASES OF STOMACH AND DUODENUM: ICD-10-CM

## 2023-03-21 DIAGNOSIS — K21.9 ACID REFLUX: ICD-10-CM

## 2023-03-21 DIAGNOSIS — K50.10 CC (CROHN'S COLITIS): ICD-10-CM

## 2023-03-21 DIAGNOSIS — K51.40 INFLAMMATORY POLYPS OF COLON WITHOUT COMPLICATIONS: ICD-10-CM

## 2023-03-21 PROCEDURE — G8907 PT DOC NO EVENTS ON DISCHARG: HCPCS | Performed by: INTERNAL MEDICINE

## 2023-03-21 PROCEDURE — 88342 IMHCHEM/IMCYTCHM 1ST ANTB: CPT | Performed by: PATHOLOGY

## 2023-03-21 PROCEDURE — 88312 SPECIAL STAINS GROUP 1: CPT | Performed by: PATHOLOGY

## 2023-03-21 PROCEDURE — 45380 COLONOSCOPY AND BIOPSY: CPT | Performed by: INTERNAL MEDICINE

## 2023-03-21 PROCEDURE — 88341 IMHCHEM/IMCYTCHM EA ADD ANTB: CPT | Performed by: PATHOLOGY

## 2023-03-21 PROCEDURE — 43239 EGD BIOPSY SINGLE/MULTIPLE: CPT | Performed by: INTERNAL MEDICINE

## 2023-03-21 PROCEDURE — 88305 TISSUE EXAM BY PATHOLOGIST: CPT | Performed by: PATHOLOGY

## 2023-03-21 RX ORDER — FOLIC ACID 1 MG/1
TAKE 5 TABLETS BY MOUTH ONCE WEEKLY TABLET ORAL
Qty: 60 TABLET | Refills: 4 | Status: ACTIVE | COMMUNITY

## 2023-03-21 RX ORDER — METHOTREXATE SODIUM 25 MG/ML
INJECT 0.4 MILLILITER ONCE A WEEK FOR 28 DAYS INJECTION, SOLUTION INTRA-ARTERIAL; INTRAMUSCULAR; INTRAVENOUS
Qty: 4 | Refills: 5 | Status: ACTIVE | COMMUNITY
Start: 2020-02-21

## 2023-03-21 RX ORDER — BUDESONIDE 3 MG/1
TAKE 3 CAPSULES BY MOUTH IN THE MORNING CAPSULE, GELATIN COATED ORAL
Qty: 270 | Refills: 2 | Status: ACTIVE | COMMUNITY

## 2023-03-21 RX ORDER — OMEPRAZOLE 40 MG/1
TAKE 1 CAPSULE BY MOUTH EVERY DAY BEFORE MEALS CAPSULE, DELAYED RELEASE ORAL
Qty: 30 CAPSULE | Refills: 11 | Status: ACTIVE | COMMUNITY

## 2023-03-21 RX ORDER — USTEKINUMAB 90 MG/ML
INJECT 90 MG INJECTION, SOLUTION SUBCUTANEOUS
Qty: 1 EACH | Refills: 8 | Status: ACTIVE | COMMUNITY

## 2023-03-21 RX ORDER — BUDESONIDE 3 MG/1
3 CAPSULES CAPSULE ORAL ONCE A DAY
Status: ACTIVE | COMMUNITY

## 2023-03-21 RX ORDER — METHOTREXATE 25 MG/ML
INJECT 0.4 MILLILITER ONCE A WEEK INJECTION INTRA-ARTERIAL; INTRAMUSCULAR; INTRATHECAL; INTRAVENOUS
Qty: 8 MILLILITER | Refills: 3 | Status: ACTIVE | COMMUNITY

## 2023-03-27 ENCOUNTER — TELEPHONE ENCOUNTER (OUTPATIENT)
Dept: URBAN - METROPOLITAN AREA CLINIC 98 | Facility: CLINIC | Age: 43
End: 2023-03-27

## 2023-03-27 RX ORDER — OMEPRAZOLE 40 MG/1
1 CAPSULE 30 MINUTES BEFORE MORNING MEAL CAPSULE, DELAYED RELEASE ORAL ONCE A DAY
Qty: 90 CAPSULES | Refills: 3 | OUTPATIENT
Start: 2023-03-27

## 2023-04-01 LAB
A/G RATIO: 1.6
ALBUMIN: 4.1
ALKALINE PHOSPHATASE: 72
ALT (SGPT): 24
AST (SGOT): 19
BASO (ABSOLUTE): 0.1
BASOS: 1
BILIRUBIN, TOTAL: <0.2
BUN/CREATININE RATIO: 13
BUN: 10
C-REACTIVE PROTEIN, QUANT: 13
CALCIUM: 9.2
CARBON DIOXIDE, TOTAL: 23
CHLORIDE: 102
CREATININE: 0.8
EGFR: 94
EOS (ABSOLUTE): 0.2
EOS: 2
FERRITIN, SERUM: 46
FOLATE (FOLIC ACID), SERUM: >20
GLOBULIN, TOTAL: 2.6
GLUCOSE: 88
HEMATOCRIT: 39.4
HEMATOLOGY COMMENTS:: (no result)
HEMOGLOBIN: 12.8
IMMATURE CELLS: (no result)
IMMATURE GRANS (ABS): 0
IMMATURE GRANULOCYTES: 1
IRON BIND.CAP.(TIBC): 276
IRON SATURATION: 49
IRON: 135
LYMPHS (ABSOLUTE): 2.1
LYMPHS: 26
MCH: 28.9
MCHC: 32.5
MCV: 89
MONOCYTES(ABSOLUTE): 0.7
MONOCYTES: 8
NEUTROPHILS (ABSOLUTE): 5
NEUTROPHILS: 62
NRBC: (no result)
PLATELETS: 396
POTASSIUM: 4.4
PROTEIN, TOTAL: 6.7
RBC: 4.43
RDW: 12.3
SEDIMENTATION RATE-WESTERGREN: 19
SODIUM: 138
UIBC: 141
VITAMIN B12: 1268
VITAMIN D, 25-HYDROXY: 47.9
WBC: 8

## 2023-04-17 ENCOUNTER — OFFICE VISIT (OUTPATIENT)
Dept: URBAN - METROPOLITAN AREA CLINIC 98 | Facility: CLINIC | Age: 43
End: 2023-04-17
Payer: COMMERCIAL

## 2023-04-17 VITALS — BODY MASS INDEX: 35.94 KG/M2 | WEIGHT: 229 LBS | HEIGHT: 67 IN

## 2023-04-17 DIAGNOSIS — R10.84 ABDOMINAL PAIN, GENERALIZED: ICD-10-CM

## 2023-04-17 DIAGNOSIS — K62.89 ANAL PAIN: ICD-10-CM

## 2023-04-17 DIAGNOSIS — L40.9 PSORIASIS: ICD-10-CM

## 2023-04-17 DIAGNOSIS — K50.80 CROHN'S COLITIS: ICD-10-CM

## 2023-04-17 DIAGNOSIS — K92.1 HEMATOCHEZIA: ICD-10-CM

## 2023-04-17 DIAGNOSIS — K21.9 ACID REFLUX: ICD-10-CM

## 2023-04-17 DIAGNOSIS — R19.7 DIARRHEA: ICD-10-CM

## 2023-04-17 PROCEDURE — 99214 OFFICE O/P EST MOD 30 MIN: CPT | Performed by: INTERNAL MEDICINE

## 2023-04-17 RX ORDER — OMEPRAZOLE 40 MG/1
TAKE 1 CAPSULE BY MOUTH EVERY DAY BEFORE MEALS CAPSULE, DELAYED RELEASE ORAL
Qty: 30 CAPSULE | Refills: 11 | Status: ACTIVE | COMMUNITY

## 2023-04-17 RX ORDER — METHOTREXATE 25 MG/ML
INJECT 0.4 MILLILITER ONCE A WEEK INJECTION INTRA-ARTERIAL; INTRAMUSCULAR; INTRATHECAL; INTRAVENOUS
Qty: 8 MILLILITER | Refills: 3 | Status: ACTIVE | COMMUNITY

## 2023-04-17 RX ORDER — OMEPRAZOLE 40 MG/1
1 CAPSULE 30 MINUTES BEFORE MORNING MEAL CAPSULE, DELAYED RELEASE ORAL ONCE A DAY
Qty: 90 CAPSULES | Refills: 3 | Status: ACTIVE | COMMUNITY
Start: 2023-03-27

## 2023-04-17 RX ORDER — USTEKINUMAB 90 MG/ML
INJECT 90 MG INJECTION, SOLUTION SUBCUTANEOUS
Qty: 1 EACH | Refills: 8 | Status: ACTIVE | COMMUNITY

## 2023-04-17 RX ORDER — METHOTREXATE SODIUM 25 MG/ML
INJECT 0.4 MILLILITER ONCE A WEEK FOR 28 DAYS INJECTION, SOLUTION INTRA-ARTERIAL; INTRAMUSCULAR; INTRAVENOUS
Qty: 4 | Refills: 5 | Status: ACTIVE | COMMUNITY
Start: 2020-02-21

## 2023-04-17 RX ORDER — BUDESONIDE 3 MG/1
TAKE 3 CAPSULES BY MOUTH IN THE MORNING CAPSULE, GELATIN COATED ORAL
Qty: 270 | Refills: 2 | Status: ACTIVE | COMMUNITY

## 2023-04-17 RX ORDER — FOLIC ACID 1 MG/1
TAKE 5 TABLETS BY MOUTH ONCE WEEKLY TABLET ORAL
Qty: 60 TABLET | Refills: 4 | Status: ACTIVE | COMMUNITY

## 2023-04-17 RX ORDER — BUDESONIDE 3 MG/1
3 CAPSULES CAPSULE ORAL ONCE A DAY
Status: ACTIVE | COMMUNITY

## 2023-04-17 NOTE — HPI-TODAY'S VISIT:
43 yo female returns for 7 moths f/u and 3 weeks after EGd and Colonoscopy. Colon showed inflammatuon of right colon and left colon ulcers were resolved ( from 2021) and changes on bx showed inactive left colon CD. Right colon bx showed patchy inflammation.  Syx wise - she feels well. AGWS. No joint aches.  Stelara re-induction  4/11/22 resulted in resolutuionn of arthralgias and increase of trough level. Did not have much GI syx at that time.  lab review from 3/2/23 shows nl lacto, calpro 91, mild elev of crp 13. Will order trough level  before next infusion    =========================== 9/28/22 40 yo female with CD comes in for 5 month f/u. Doing well. Had Stelara re-induction on 4/11/22 and it helped her low trough level. She was not having much in the way of syx and she was having 4 stools a day and now 2 or 3.  no pain or bleeding. No joint or psoriasis.  Blood labs done 2 days ago.  Stelara has helped much more than Cimzia and she is in clinical remission. Last colonoscopy was 10/7/21 and showed left colon ulceration. Needs a f/u- decided on Feb 2023.   On Omep so EGD too.  Methotrexate d/c after last visit 10 mg sq q week --- and has done fine off it. ======================== 5/9/22  40 yo female comes in for 2 adrianne f/u. Had re-induction on 4/11/22. Was asyx but was done on basis of low Stelara level and elevated ESR and CRp.  also have tried to stop mtx .4 cc q week. Last dose 4/5/22  Stool frequency or other changes none. Maybe softer.  Getting Stelara every 8 weeks. Next dose is June - sq.  6/6.  Will order trough labs for October- about 6 months after re-induction, and f/u after that.    \========================= 3/9/22  40 yo female dx with CD 2017 and UC 2007 comes in for 2 month interval f/u.  STelara 90 q 8w Methotrexate .4 cc q week Folic acid  Last dose was 1/12 and TDM showed 1.7 uste trough.  no pain. no bleeding no diarrhea energy good. Tiny area of scalp psoriasis.  Will try getting Stelara approved, get trough again and lab    ======================================== 1/10/22  40 yo female with CD dx 2017 on Stelara 90 mg sq q 8 weeks and mtx, now reduced from .6 to .4 per week.  15 mg decreased to 10 mg sq q weeks for the past 6 months or so.  Next dose in 2 days. Insurance approval [employer] - got a robo call 3 days ago,   No abd pain. No diarrhea. Energy is good. Sometimes has an increase in blood just before next dose, but no such syx today. No joint aches. No psoriasis.  Asyx.  Colonoscopy 10/7/21 showed rectal inflammation visually and on bx, sigmoid ulceration visually, and only inflammation on bx, pancolitis-inactive, and left sided pseudopolyps.  So plan to do TDM today and ?????modify dosing. Not enough syx for POWER study.  ======================== 6/28/21  41 yo female for CD f/u. 10 months. Been on Stelara 1.5 years or so.  Doing really well on Stelara.  No diarrhea. No pain. Psoriasis almost inactive. No topical. Occasional blood in stool twice a month.  No other medical issues. Last colonoscopy- summer of 2019. CD duration ----- 2017. Need colonoscopy  in next 4 months. Is taking omeprazole 40 mg a day for heartburn.  Stelara 90 q 8 Mtx .6 cc or 12.5 mg q week Folic acid 1mg 5 per week Omeprazole 40 mg q am.    ============================ 8/24/20  38 yo female with Crohn's disease for [place].  Next  4-5 stools a day. Occasional blood in stool Gets pain 2-3 times a week or more Fatigue No joint aches  Psoriasis is inactive. Stelara - tx.   ==============================  6/22/20  38 yo female with Crohn's disease comes in for face to face 6 month f/u.  Doing the same as Dec 2.  Tapered off Entocort but is back on  6 mg a day.  Started Stelara Sept 2019. Last dose of Cimzia was July 2019 Colonoscopy 7/15/19 and this showed ulceratioon in multiple areas of the colon.  Currently 5 stools a day, pre- Stelara it was 8 and so there is improvement. Urgency is better. Visible blood is gone.  Soaking in a tub and sitz bath and using hemorrhoid, preparation H.  Might try 2 week course of flagyl 500 bid. Culturelle once a day.  Stelara 90 q 8 weeks Mtx .6 q week folic acid 1 mg  Entocort 2 per day. Omeprazole 40 mg a day Culturelle Psotiasis has resolved with Stelara. ================================================================= 12/2/19  Plan 12/2/19 omeprazole 40 mg oral capsule,delayed release(DR/EC)  SIG: take 1 capsule (40 mg) by oral route once daily before a meal for 90 days  DISP: (90) capsules with 3 refills Prescribed on 12/02/2019    Dexilant 60 mg oral capsule,biphase delayed releas  SIG: take 1 capsule (60 mg) by oral route once daily  DISP: (90) capsules with 1 refills Discontinued on 12/02/2019    Medications have been Reconciled  Transition of Care or Provider Policy  InstructionsI have documented a list of current medications and reviewed it with the patient.  I encouraged the patient to diet and exercise.  Electronically Identified Patient Education Materials Provided Electronically  DispositionReturn To Clinic in 6 Months  CorrespondenceCC this document (Alize Valente MD) - 12/2/2019    3 days of xifaxan given for [place] travel.  Start tapering budesonide from 9 to 6 to 3 mg q month change.         12/2/19 HPI Follow-up Crohn's Disease    History Of Present Illness  This is a scheduled appointment for this patient, a 38 year old /White female, after a previous visit approximately Crohn's disease.     37 yo female with CD and recent Cimzia comes in for f/u.  Doing well.  Stooling is more formed. Rare blood in stool.  Big improvement.  Psoriasis is good.  Soreness around bottom. Has used hemorrhoidal cream. Feels irritated    == 8/26/19  37 yo female Stelara infusion 8/5 Less bleeding and for the first week, stools were more formed but now looser and more tarry. Not sure why.  Not on an acid blocker, never was.  No upper pain, but does have lower spasms that is the same as pre-STelara. Urgency is the same.   ====== 6/24/19   37 yo female with CD last seen 5/15 and last had Cimzia 4 days ago, 6/20 and is taking 2 injections -- 400 mg every 2 weeks.  3 loose and 1 formed stool.  Painful. Formed stool causes lower abd pain. Rectal bleeding with every stool. Low energy  Been on prednisone 25 a day for 2 months.  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  Psoriasis was dx and confirmed by Dr. Buchanan and she did cortisone injection and is just a nidus on head.  Left knee aches.  Discussed options such as Humira, Remicade and Stelara.  Prefers Budesonide over Prednisone.  5/15/19 labs on pred  showed "indeterminate" quantiferon, that we need to recheck.   ==== 5/15/19  37 yo female with CD dx 2007 (initally thought to be UC comes in for 6 month f/u).  Beem pm pred for 5 weeks.  4-5 bloody stools a day.  Urgency.  Never had c diff.  Psotiasis or scaly area on scalp for 4 years, and got better with Cimzia, which was started in 2015.  Cimzia helped CD also, and has escalated to 400 mg q 2 weeks. Mtx .8cc q week. 25mg predniosone   Last dose Cimzia was Monday May 6.  Needs 8 week wash for Julia study which we discussed.  Also stress as she is having divorce in progress.  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      Past Medical History  Disease Name Date Onset Notes  Abdominal pain, generalized 06/24/2019 --   Anal pain 12/02/2019 --   Black tarry stools 08/26/2019 --   Crohn disease unk --   Diarrhea 11/28/2018 --   Hematochezia 05/15/2019 --   Influenza Immunization unk --   Psoriasis 11/15/2018 --       Past Surgical History  Procedure Name Date Notes  Colonoscopy 5/2018 12/02/2019 - 05/15/2019 - 11/28/2018 - 11/15/2018 - 04/11/2018 - 06/17/2016 - 05/06/2016 - 04/11/2016 -   EGD 8/2014 12/02/2019 - 05/15/2019 - 11/28/2018 - 11/15/2018 - 04/11/2018 - 06/17/2016 - 05/06/2016 - 04/11/2016 -       Medication List  Name Date Started Instructions  Dexilant 60 mg oral capsule,biphase delayed releas  take 1 capsule (60 mg) by oral route once daily  Entocort EC 3 mg oral capsule,delayed,extend.release 06/24/2019 take 3 capsules (9 mg) by oral route once daily in the morning for up to 8 weeks for 30 days  folic acid oral  --   methotrexate sodium 25 mg/mL injection solution 11/28/2018 inject 1 milliliter once a week for 28 days  Stelara 90 mg/mL subcutaneous syringe 06/24/2019 inject 1 milliliter (90 mg) by subcutaneous route every 8 weeks for 60 days      Allergy List  Allergen Name Date Reaction Notes  No Known Environmental Allergies --  --  05/06/2016 - 04/11/2016 -   Dairy allergy --  --  05/06/2016 - 04/11/2016 -   mercaptopurine --  --  --   other --  --  Mesalamine based drugs      Family Medical History  Disease Name Relative/Age Notes  Family history of colon polyps Mother/45  5/6/2016  Family history of breast cancer Mother/46  5/6/2016      Social History

## 2023-04-17 NOTE — PHYSICAL EXAM RECTAL:
normal tone, no external hemorrhoids, no masses palpable, no red blood, Tenderness on ROC, Internal hemorrhoids present

## 2023-04-23 ENCOUNTER — ERX REFILL RESPONSE (OUTPATIENT)
Dept: URBAN - METROPOLITAN AREA CLINIC 98 | Facility: CLINIC | Age: 43
End: 2023-04-23

## 2023-04-23 RX ORDER — USTEKINUMAB 90 MG/ML
INJECT 90 MG UNDER THE SKIN EVERY 8 WEEKS INJECTION, SOLUTION SUBCUTANEOUS
Qty: 1 MILLILITER | Refills: 0 | OUTPATIENT

## 2023-04-23 RX ORDER — USTEKINUMAB 90 MG/ML
INJECT 90 MG INJECTION, SOLUTION SUBCUTANEOUS
Qty: 1 EACH | Refills: 8 | OUTPATIENT

## 2023-05-18 LAB
A/G RATIO: 1.7
ALBUMIN: 4.5
ALKALINE PHOSPHATASE: 76
ALT (SGPT): 20
ANTI-USTEKINUMAB ANTIBODY: <40
AST (SGOT): 19
BASO (ABSOLUTE): 0.1
BASOS: 1
BILIRUBIN, TOTAL: <0.2
BUN/CREATININE RATIO: 10
BUN: 8
C-REACTIVE PROTEIN, QUANT: 12
CALCIUM: 9.5
CARBON DIOXIDE, TOTAL: 18
CHLORIDE: 100
CREATININE: 0.83
EGFR: 90
EOS (ABSOLUTE): 0.1
EOS: 2
GLOBULIN, TOTAL: 2.7
GLUCOSE: 100
HBSAG SCREEN: NEGATIVE
HEMATOCRIT: 41
HEMATOLOGY COMMENTS:: (no result)
HEMOGLOBIN: 13.2
HEP B CORE AB, IGM: NEGATIVE
HEP B CORE AB, TOT: NEGATIVE
HEP B SURFACE AB, QUAL: REACTIVE
IMMATURE CELLS: (no result)
IMMATURE GRANS (ABS): 0
IMMATURE GRANULOCYTES: 1
LYMPHS (ABSOLUTE): 2
LYMPHS: 23
Lab: (no result)
MCH: 29.4
MCHC: 32.2
MCV: 91
MONOCYTES(ABSOLUTE): 0.5
MONOCYTES: 6
NEUTROPHILS (ABSOLUTE): 5.9
NEUTROPHILS: 67
NRBC: (no result)
PLATELETS: 442
POTASSIUM: 4.1
PROTEIN, TOTAL: 7.2
QUANTIFERON CRITERIA: (no result)
QUANTIFERON INCUBATION: (no result)
QUANTIFERON MITOGEN VALUE: >10
QUANTIFERON NIL VALUE: 0.01
QUANTIFERON TB1 AG VALUE: 0.02
QUANTIFERON TB2 AG VALUE: 0.01
QUANTIFERON-TB GOLD PLUS: NEGATIVE
RBC: 4.49
RDW: 13.2
SEDIMENTATION RATE-WESTERGREN: 18
SODIUM: 139
USTEKINUMAB: 1.6
WBC: 8.6

## 2023-05-20 ENCOUNTER — TELEPHONE ENCOUNTER (OUTPATIENT)
Dept: URBAN - METROPOLITAN AREA CLINIC 98 | Facility: CLINIC | Age: 43
End: 2023-05-20

## 2023-05-20 RX ORDER — USTEKINUMAB 130 MG/26ML
AS DIRECTED SOLUTION INTRAVENOUS ONCE
Qty: 520 MILLIGRAMS | Refills: 0 | OUTPATIENT
Start: 2023-05-20 | End: 2023-05-21

## 2023-05-22 ENCOUNTER — TELEPHONE ENCOUNTER (OUTPATIENT)
Dept: URBAN - METROPOLITAN AREA CLINIC 98 | Facility: CLINIC | Age: 43
End: 2023-05-22

## 2023-05-22 RX ORDER — USTEKINUMAB 130 MG/26ML
AS DIRECTED SOLUTION INTRAVENOUS ONCE
Qty: 520 MILLIGRAMS | Refills: 0
Start: 2023-05-20 | End: 2023-05-23

## 2023-06-14 ENCOUNTER — ERX REFILL RESPONSE (OUTPATIENT)
Dept: URBAN - METROPOLITAN AREA CLINIC 98 | Facility: CLINIC | Age: 43
End: 2023-06-14

## 2023-06-14 RX ORDER — USTEKINUMAB 90 MG/ML
INJECT 90 MG UNDER THE SKIN EVERY 8 WEEKS INJECTION, SOLUTION SUBCUTANEOUS
Qty: 1 MILLILITER | Refills: 0 | OUTPATIENT

## 2023-06-20 ENCOUNTER — TELEPHONE ENCOUNTER (OUTPATIENT)
Dept: URBAN - METROPOLITAN AREA CLINIC 97 | Facility: CLINIC | Age: 43
End: 2023-06-20

## 2023-06-20 RX ORDER — USTEKINUMAB 130 MG/26ML
INFUSE 520 MG SOLUTION INTRAVENOUS ONCE
Qty: 4 EACH | Refills: 0 | OUTPATIENT
Start: 2023-06-20 | End: 2023-06-21

## 2023-06-27 ENCOUNTER — TELEPHONE ENCOUNTER (OUTPATIENT)
Dept: URBAN - METROPOLITAN AREA CLINIC 98 | Facility: CLINIC | Age: 43
End: 2023-06-27

## 2023-06-27 RX ORDER — USTEKINUMAB 90 MG/ML
INJECT 90 MG INJECTION, SOLUTION SUBCUTANEOUS
Qty: 1 EACH | Refills: 9
Start: 2023-06-27 | End: 2025-01-07

## 2023-06-27 RX ORDER — USTEKINUMAB 90 MG/ML
INJECT 90 MG INJECTION, SOLUTION SUBCUTANEOUS
Qty: 1 EACH | Refills: 9 | OUTPATIENT
Start: 2023-06-27 | End: 2025-01-06

## 2023-06-28 ENCOUNTER — TELEPHONE ENCOUNTER (OUTPATIENT)
Dept: URBAN - METROPOLITAN AREA CLINIC 98 | Facility: CLINIC | Age: 43
End: 2023-06-28

## 2023-06-28 RX ORDER — USTEKINUMAB 90 MG/ML
INJECT 90 MG UNDER THE SKIN EVERY 8 WEEKS INJECTION, SOLUTION SUBCUTANEOUS
Qty: 1 MILLILITER | Refills: 0 | Status: DISCONTINUED | COMMUNITY

## 2023-06-28 RX ORDER — OMEPRAZOLE 40 MG/1
1 CAPSULE 30 MINUTES BEFORE MORNING MEAL CAPSULE, DELAYED RELEASE ORAL ONCE A DAY
Qty: 90 CAPSULES | Refills: 3 | Status: ACTIVE | COMMUNITY
Start: 2023-03-27

## 2023-06-28 RX ORDER — BUDESONIDE 3 MG/1
3 CAPSULES CAPSULE ORAL ONCE A DAY
Status: ACTIVE | COMMUNITY

## 2023-06-28 RX ORDER — METHOTREXATE 25 MG/ML
INJECT 0.4 MILLILITER ONCE A WEEK INJECTION INTRA-ARTERIAL; INTRAMUSCULAR; INTRATHECAL; INTRAVENOUS
Qty: 8 MILLILITER | Refills: 3 | Status: ACTIVE | COMMUNITY

## 2023-06-28 RX ORDER — METHOTREXATE SODIUM 25 MG/ML
INJECT 0.4 MILLILITER ONCE A WEEK FOR 28 DAYS INJECTION, SOLUTION INTRA-ARTERIAL; INTRAMUSCULAR; INTRAVENOUS
Qty: 4 | Refills: 5 | Status: ACTIVE | COMMUNITY
Start: 2020-02-21

## 2023-06-28 RX ORDER — USTEKINUMAB 90 MG/ML
INJECT 90 MG INJECTION, SOLUTION SUBCUTANEOUS
Qty: 1 EACH | Refills: 9 | Status: ACTIVE | COMMUNITY
Start: 2023-06-27 | End: 2025-01-07

## 2023-06-28 RX ORDER — BUDESONIDE 3 MG/1
TAKE 3 CAPSULES BY MOUTH IN THE MORNING CAPSULE, GELATIN COATED ORAL
Qty: 270 | Refills: 2 | Status: ACTIVE | COMMUNITY

## 2023-06-28 RX ORDER — FOLIC ACID 1 MG/1
TAKE 5 TABLETS BY MOUTH ONCE WEEKLY TABLET ORAL
Qty: 60 TABLET | Refills: 4 | Status: ACTIVE | COMMUNITY

## 2023-06-28 RX ORDER — OMEPRAZOLE 40 MG/1
TAKE 1 CAPSULE BY MOUTH EVERY DAY BEFORE MEALS CAPSULE, DELAYED RELEASE ORAL
Qty: 30 CAPSULE | Refills: 11 | Status: ACTIVE | COMMUNITY

## 2023-06-29 ENCOUNTER — TELEPHONE ENCOUNTER (OUTPATIENT)
Dept: URBAN - METROPOLITAN AREA CLINIC 97 | Facility: CLINIC | Age: 43
End: 2023-06-29

## 2023-06-30 ENCOUNTER — OFFICE VISIT (OUTPATIENT)
Dept: URBAN - METROPOLITAN AREA TELEHEALTH 2 | Facility: TELEHEALTH | Age: 43
End: 2023-06-30

## 2023-06-30 ENCOUNTER — TELEPHONE ENCOUNTER (OUTPATIENT)
Dept: URBAN - METROPOLITAN AREA CLINIC 98 | Facility: CLINIC | Age: 43
End: 2023-06-30

## 2023-07-11 ENCOUNTER — TELEPHONE ENCOUNTER (OUTPATIENT)
Dept: URBAN - METROPOLITAN AREA CLINIC 98 | Facility: CLINIC | Age: 43
End: 2023-07-11

## 2023-07-11 RX ORDER — USTEKINUMAB 90 MG/ML
INJECT 90 MG INJECTION, SOLUTION SUBCUTANEOUS
Qty: 1 EACH | Refills: 9
Start: 2023-06-27 | End: 2025-01-21

## 2023-08-11 ENCOUNTER — TELEPHONE ENCOUNTER (OUTPATIENT)
Dept: URBAN - METROPOLITAN AREA CLINIC 97 | Facility: CLINIC | Age: 43
End: 2023-08-11

## 2024-03-20 ENCOUNTER — OV EP (OUTPATIENT)
Dept: URBAN - METROPOLITAN AREA CLINIC 98 | Facility: CLINIC | Age: 44
End: 2024-03-20
Payer: COMMERCIAL

## 2024-03-20 VITALS
HEART RATE: 101 BPM | BODY MASS INDEX: 38.36 KG/M2 | WEIGHT: 244.4 LBS | DIASTOLIC BLOOD PRESSURE: 83 MMHG | TEMPERATURE: 98.1 F | HEIGHT: 67 IN | SYSTOLIC BLOOD PRESSURE: 128 MMHG

## 2024-03-20 DIAGNOSIS — K50.80 CROHN'S COLITIS: ICD-10-CM

## 2024-03-20 DIAGNOSIS — Z79.620 LONG TERM (CURRENT) USE OF IMMUNOSUPPRESSIVE BIOLOGIC: ICD-10-CM

## 2024-03-20 DIAGNOSIS — K21.9 ACID REFLUX: ICD-10-CM

## 2024-03-20 DIAGNOSIS — K92.1 BLACK TARRY STOOLS: ICD-10-CM

## 2024-03-20 DIAGNOSIS — L40.9 PSORIASIS: ICD-10-CM

## 2024-03-20 DIAGNOSIS — R19.7 DIARRHEA: ICD-10-CM

## 2024-03-20 DIAGNOSIS — R10.84 ABDOMINAL PAIN, GENERALIZED: ICD-10-CM

## 2024-03-20 DIAGNOSIS — K62.89 ANAL PAIN: ICD-10-CM

## 2024-03-20 DIAGNOSIS — K50.90 CROHN DISEASE: ICD-10-CM

## 2024-03-20 DIAGNOSIS — K92.1 HEMATOCHEZIA: ICD-10-CM

## 2024-03-20 PROCEDURE — 99214 OFFICE O/P EST MOD 30 MIN: CPT | Performed by: INTERNAL MEDICINE

## 2024-03-20 RX ORDER — OMEPRAZOLE 40 MG/1
1 CAPSULE 30 MINUTES BEFORE MORNING MEAL CAPSULE, DELAYED RELEASE ORAL ONCE A DAY
Qty: 90 CAPSULES | Refills: 3 | Status: ACTIVE | COMMUNITY
Start: 2023-03-27

## 2024-03-20 RX ORDER — USTEKINUMAB 90 MG/ML
INJECT 90 MG INJECTION, SOLUTION SUBCUTANEOUS
Qty: 1 EACH | Refills: 9 | Status: ACTIVE | COMMUNITY
Start: 2023-06-27 | End: 2025-01-21

## 2024-03-20 RX ORDER — ROSUVASTATIN CALCIUM 40 MG/1
1 TABLET TABLET, COATED ORAL ONCE A DAY
Status: ACTIVE | COMMUNITY

## 2024-03-20 NOTE — HPI-TODAY'S VISIT:
42 yo female with CD comes in for 11 month f/u.  Doing well. No pain diarrhea or bleeding. 2-3 stools a day. Zero resections.  GERD on omeprazole ---0meprazole has been denied refills but she gets syx if the tx is stopped. Risk/benefit and discussed need for refill and also try otc 20 mg and see if it is strong enough Stelara trough 2.4 - better than prior 1.8 without added dosing =========================== 4/1/7/23  41 yo female returns for 7 moths f/u and 3 weeks after EGd and Colonoscopy. Colon showed inflammatuon of right colon and left colon ulcers were resolved ( from 2021) and changes on bx showed inactive left colon CD. Right colon bx showed patchy inflammation.  Syx wise - she feels well. AGWS. No joint aches.  Stelara re-induction 4/11/22 resulted in resolutuionn of arthralgias and increase of trough level. Did not have much GI syx at that time.  lab review from 3/2/23 shows nl lacto, calpro 91, mild elev of crp 13. Will order trough level before next infusion    =========================== 9/28/22 40 yo female with CD comes in for 5 month f/u. Doing well. Had Stelara re-induction on 4/11/22 and it helped her low trough level. She was not having much in the way of syx and she was having 4 stools a day and now 2 or 3.  no pain or bleeding. No joint or psoriasis.  Blood labs done 2 days ago.  Stelara has helped much more than Cimzia and she is in clinical remission. Last colonoscopy was 10/7/21 and showed left colon ulceration. Needs a f/u- decided on Feb 2023. On Omep so EGD too.  Methotrexate d/c after last visit 10 mg sq q week --- and has done fine off it. ======================== 5/9/22  40 yo female comes in for 2 adrianne f/u. Had re-induction on 4/11/22. Was asyx but was done on basis of low Stelara level and elevated ESR and CRp.  also have tried to stop mtx .4 cc q week. Last dose 4/5/22  Stool frequency or other changes none. Maybe softer.  Getting Stelara every 8 weeks. Next dose is June - sq. 6/6.  Will order trough labs for October- about 6 months after re-induction, and f/u after that.    \========================= 3/9/22  40 yo female dx with CD 2017 and UC 2007 comes in for 2 month interval f/u.  STelara 90 q 8w Methotrexate .4 cc q week Folic acid  Last dose was 1/12 and TDM showed 1.7 uste trough.  no pain. no bleeding no diarrhea energy good. Tiny area of scalp psoriasis.  Will try getting Stelara approved, get trough again and lab    ======================================== 1/10/22  40 yo female with CD dx 2017 on Stelara 90 mg sq q 8 weeks and mtx, now reduced from .6 to .4 per week.  15 mg decreased to 10 mg sq q weeks for the past 6 months or so.  Next dose in 2 days. Insurance approval [employer] - got a robo call 3 days ago,  No abd pain. No diarrhea. Energy is good. Sometimes has an increase in blood just before next dose, but no such syx today. No joint aches. No psoriasis.  Asyx.  Colonoscopy 10/7/21 showed rectal inflammation visually and on bx, sigmoid ulceration visually, and only inflammation on bx, pancolitis-inactive, and left sided pseudopolyps.  So plan to do TDM today and ?????modify dosing. Not enough syx for POWER study.  ======================== 6/28/21  39 yo female for CD f/u. 10 months. Been on Stelara 1.5 years or so.  Doing really well on Stelara. No diarrhea. No pain. Psoriasis almost inactive. No topical. Occasional blood in stool twice a month.  No other medical issues. Last colonoscopy- summer of 2019. CD duration ----- 2017. Need colonoscopy in next 4 months. Is taking omeprazole 40 mg a day for heartburn.  Stelara 90 q 8 Mtx .6 cc or 12.5 mg q week Folic acid 1mg 5 per week Omeprazole 40 mg q am.    ============================ 8/24/20  40 yo female with Crohn's disease for telehealth.  Next 4-5 stools a day. Occasional blood in stool Gets pain 2-3 times a week or more Fatigue No joint aches  Psoriasis is inactive. Stelara - tx.   ==============================  6/22/20  40 yo female with Crohn's disease comes in for face to face 6 month f/u.  Doing the same as Dec 2.  Tapered off Entocort but is back on 6 mg a day.  Started Stelara Sept 2019. Last dose of Cimzia was July 2019 Colonoscopy 7/15/19 and this showed ulceratioon in multiple areas of the colon.  Currently 5 stools a day, pre- Stelara it was 8 and so there is improvement. Urgency is better. Visible blood is gone.  Soaking in a tub and sitz bath and using hemorrhoid, preparation H.  Might try 2 week course of flagyl 500 bid. Culturelle once a day.  Stelara 90 q 8 weeks Mtx .6 q week folic acid 1 mg Entocort 2 per day. Omeprazole 40 mg a day Culturelle Psotiasis has resolved with Stelara. ================================================================= 12/2/19  Plan 12/2/19 omeprazole 40 mg oral capsule,delayed release(DR/EC) SIG: take 1 capsule (40 mg) by oral route once daily before a meal for 90 days DISP: (90) capsules with 3 refills Prescribed on 12/02/2019   Dexilant 60 mg oral capsule,biphase delayed releas SIG: take 1 capsule (60 mg) by oral route once daily DISP: (90) capsules with 1 refills Discontinued on 12/02/2019   Medications have been Reconciled Transition of Care or Provider Policy InstructionsI have documented a list of current medications and reviewed it with the patient. I encouraged the patient to diet and exercise. Electronically Identified Patient Education Materials Provided Electronically DispositionReturn To Clinic in 6 Months CorrespondenceCC this document (Alize Valente MD) - 12/2/2019  3 days of xifaxan given for [place] travel.  Start tapering budesonide from 9 to 6 to 3 mg q month change.        12/2/19 HPI Follow-up Crohn's Disease   History Of Present Illness This is a scheduled appointment for this patient, a 38 year old /White female, after a previous visit approximately Crohn's disease.   37 yo female with CD and recent Cimzia comes in for f/u.  Doing well.  Stooling is more formed. Rare blood in stool.  Big improvement.  Psoriasis is good.  Soreness around bottom. Has used hemorrhoidal cream. Feels irritated    == 8/26/19  37 yo female Stelara infusion 8/5 Less bleeding and for the first week, stools were more formed but now looser and more tarry. Not sure why.  Not on an acid blocker, never was.  No upper pain, but does have lower spasms that is the same as pre-STelara. Urgency is the same.   ====== 6/24/19   37 yo female with CD last seen 5/15 and last had Cimzia 4 days ago, 6/20 and is taking 2 injections -- 400 mg every 2 weeks.  3 loose and 1 formed stool. Painful. Formed stool causes lower abd pain. Rectal bleeding with every stool. Low energy  Been on prednisone 25 a day for 2 months.  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  Psoriasis was dx and confirmed by Dr. Buchanan and she did cortisone injection and is just a nidus on head.  Left knee aches.  Discussed options such as Humira, Remicade and Stelara.  Prefers Budesonide over Prednisone.  5/15/19 labs on pred showed "indeterminate" quantiferon, that we need to recheck.   ==== 5/15/19  37 yo female with CD dx 2007 (initally thought to be UC comes in for 6 month f/u).  Beem pm pred for 5 weeks.  4-5 bloody stools a day.  Urgency.  Never had c diff.  Psotiasis or scaly area on scalp for 4 years, and got better with Cimzia, which was started in 2015.  Cimzia helped CD also, and has escalated to 400 mg q 2 weeks. Mtx .8cc q week. 25mg predniosone   Last dose Cimzia was Monday May 6.  Needs 8 week wash for Julia study which we discussed.  Also stress as she is having divorce in progress.  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.     Past Medical History Disease Name Date Onset Notes Abdominal pain, generalized 06/24/2019 -- Anal pain 12/02/2019 -- Black tarry stools 08/26/2019 -- Crohn disease unk -- Diarrhea 11/28/2018 -- Hematochezia 05/15/2019 -- Influenza Immunization unk -- Psoriasis 11/15/2018 --     Past Surgical History Procedure Name Date Notes Colonoscopy 5/2018 12/02/2019 - 05/15/2019 - 11/28/2018 - 11/15/2018 - 04/11/2018 - 06/17/2016 - 05/06/2016 - 04/11/2016 - EGD 8/2014 12/02/2019 - 05/15/2019 - 11/28/2018 - 11/15/2018 - 04/11/2018 - 06/17/2016 - 05/06/2016 - 04/11/2016 -     Medication List Name Date Started Instructions Dexilant 60 mg oral capsule,biphase delayed releas take 1 capsule (60 mg) by oral route once daily Entocort EC 3 mg oral capsule,delayed,extend.release 06/24/2019 take 3 capsules (9 mg) by oral route once daily in the morning for up to 8 weeks for 30 days folic acid oral -- methotrexate sodium 25 mg/mL injection solution 11/28/2018 inject 1 milliliter once a week for 28 days Stelara 90 mg/mL subcutaneous syringe 06/24/2019 inject 1 milliliter (90 mg) by subcutaneous route every 8 weeks for 60 days     Allergy List Allergen Name Date Reaction Notes No Known Environmental Allergies -- -- 05/06/2016 - 04/11/2016 - Dairy allergy -- -- 05/06/2016 - 04/11/2016 - mercaptopurine -- -- -- other -- -- Mesalamine based drugs     Family Medical History Disease Name Relative/Age Notes Family history of colon polyps Mother/45 5/6/2016 Family history of breast cancer Mother/46 5/6/2016     Social History

## 2024-06-12 ENCOUNTER — TELEPHONE ENCOUNTER (OUTPATIENT)
Dept: URBAN - METROPOLITAN AREA CLINIC 98 | Facility: CLINIC | Age: 44
End: 2024-06-12

## 2024-06-12 RX ORDER — USTEKINUMAB 90 MG/ML
INJECT 90 MG INJECTION, SOLUTION SUBCUTANEOUS
Qty: 1 EACH | Refills: 9
Start: 2023-06-27 | End: 2025-12-24

## 2024-07-05 ENCOUNTER — ERX REFILL RESPONSE (OUTPATIENT)
Dept: URBAN - METROPOLITAN AREA CLINIC 98 | Facility: CLINIC | Age: 44
End: 2024-07-05

## 2024-07-05 RX ORDER — USTEKINUMAB 90 MG/ML
INJECT 90 MG INJECTION, SOLUTION SUBCUTANEOUS
Qty: 1 EACH | Refills: 9 | OUTPATIENT

## 2024-07-05 RX ORDER — USTEKINUMAB 90 MG/ML
INJECT 1 SYRINGE UNDER THE SKIN EVERY 8 WEEKS INJECTION, SOLUTION SUBCUTANEOUS
Qty: 1 | Refills: 9 | OUTPATIENT

## 2024-07-22 ENCOUNTER — TELEPHONE ENCOUNTER (OUTPATIENT)
Dept: URBAN - METROPOLITAN AREA CLINIC 98 | Facility: CLINIC | Age: 44
End: 2024-07-22

## 2024-07-22 RX ORDER — USTEKINUMAB 90 MG/ML
INJECT 90 MG INJECTION, SOLUTION SUBCUTANEOUS
Qty: 1 EACH | Refills: 9

## 2024-09-25 ENCOUNTER — OFFICE VISIT (OUTPATIENT)
Dept: URBAN - METROPOLITAN AREA CLINIC 98 | Facility: CLINIC | Age: 44
End: 2024-09-25

## 2024-10-08 ENCOUNTER — DASHBOARD ENCOUNTERS (OUTPATIENT)
Age: 44
End: 2024-10-08

## 2024-10-09 ENCOUNTER — OFFICE VISIT (OUTPATIENT)
Dept: URBAN - METROPOLITAN AREA CLINIC 98 | Facility: CLINIC | Age: 44
End: 2024-10-09
Payer: COMMERCIAL

## 2024-10-09 VITALS
SYSTOLIC BLOOD PRESSURE: 142 MMHG | BODY MASS INDEX: 38.45 KG/M2 | HEART RATE: 82 BPM | DIASTOLIC BLOOD PRESSURE: 88 MMHG | WEIGHT: 245 LBS | TEMPERATURE: 97.3 F | HEIGHT: 67 IN

## 2024-10-09 DIAGNOSIS — L40.9 PSORIASIS: ICD-10-CM

## 2024-10-09 DIAGNOSIS — K62.89 ANAL PAIN: ICD-10-CM

## 2024-10-09 DIAGNOSIS — K92.1 HEMATOCHEZIA: ICD-10-CM

## 2024-10-09 DIAGNOSIS — R19.7 DIARRHEA: ICD-10-CM

## 2024-10-09 DIAGNOSIS — K21.9 ACID REFLUX: ICD-10-CM

## 2024-10-09 DIAGNOSIS — R10.84 ABDOMINAL PAIN, GENERALIZED: ICD-10-CM

## 2024-10-09 DIAGNOSIS — K50.80 CROHN'S COLITIS: ICD-10-CM

## 2024-10-09 PROCEDURE — 99215 OFFICE O/P EST HI 40 MIN: CPT | Performed by: INTERNAL MEDICINE

## 2024-10-09 RX ORDER — USTEKINUMAB 90 MG/ML
INJECT 90 MG INJECTION, SOLUTION SUBCUTANEOUS
Qty: 1 EACH | Refills: 9 | Status: ACTIVE | COMMUNITY

## 2024-10-09 RX ORDER — OMEPRAZOLE 40 MG/1
1 CAPSULE 30 MINUTES BEFORE MORNING MEAL CAPSULE, DELAYED RELEASE ORAL ONCE A DAY
Qty: 90 CAPSULES | Refills: 3 | Status: ACTIVE | COMMUNITY
Start: 2023-03-27

## 2024-10-09 RX ORDER — ROSUVASTATIN CALCIUM 40 MG/1
1 TABLET TABLET, COATED ORAL ONCE A DAY
Status: ACTIVE | COMMUNITY

## 2024-10-09 NOTE — HPI-TODAY'S VISIT:
42 yo female with CD on stelara comes in for 7 month f/u and is doing well No pain diarrhea or bleeding. Stelara q 8 weeks.  Continues to benefit from Stelara and should be re-authorized indefinitely at this point. No missed doses but was supposed to be dosed at end of week, so dosed today. They had a hard time drawing blood.  Recent bone density from primary was nl Recent CPAP eval and will be starting it.  ====================== 3/20/24  42 yo female with CD comes in for 11 month f/u.  Doing well. No pain diarrhea or bleeding. 2-3 stools a day. Zero resections.  GERD on omeprazole - -0meprazole has been denied refills but she gets syx if the tx is stopped. Risk/benefit and discussed need for refill and also try otc 20 mg and see if it is strong enough Stelara trough 2.4 - better than prior 1.8 without added dosing =========================== 4/1/7/23  43 yo female returns for 7 moths f/u and 3 weeks after EGd and Colonoscopy. Colon showed inflammatuon of right colon and left colon ulcers were resolved ( from 2021) and changes on bx showed inactive left colon CD. Right colon bx showed patchy inflammation.  Syx wise - she feels well. AGWS. No joint aches.  Stelara re-induction 4/11/22 resulted in resolutuionn of arthralgias and increase of trough level. Did not have much GI syx at that time.  lab review from 3/2/23 shows nl lacto, calpro 91, mild elev of crp 13. Will order trough level before next infusion    =========================== 9/28/22 40 yo female with CD comes in for 5 month f/u. Doing well. Had Stelara re-induction on 4/11/22 and it helped her low trough level. She was not having much in the way of syx and she was having 4 stools a day and now 2 or 3.  no pain or bleeding. No joint or psoriasis.  Blood labs done 2 days ago.  Stelara has helped much more than Cimzia and she is in clinical remission. Last colonoscopy was 10/7/21 and showed left colon ulceration. Needs a f/u- decided on Feb 2023. On Omep so EGD too.  Methotrexate d/c after last visit 10 mg sq q week - - and has done fine off it. ======================== 5/9/22  40 yo female comes in for 2 adrianne f/u. Had re-induction on 4/11/22. Was asyx but was done on basis of low Stelara level and elevated ESR and CRp.  also have tried to stop mtx .4 cc q week. Last dose 4/5/22  Stool frequency or other changes none. Maybe softer.  Getting Stelara every 8 weeks. Next dose is June - sq. 6/6.  Will order trough labs for October- about 6 months after re-induction, and f/u after that.    \========================= 3/9/22  40 yo female dx with CD 2017 and UC 2007 comes in for 2 month interval f/u.  STelara 90 q 8w Methotrexate .4 cc q week Folic acid  Last dose was 1/12 and TDM showed 1.7 uste trough.  no pain. no bleeding no diarrhea energy good. Tiny area of scalp psoriasis.  Will try getting Stelara approved, get trough again and lab    ======================================== 1/10/22  40 yo female with CD dx 2017 on Stelara 90 mg sq q 8 weeks and mtx, now reduced from .6 to .4 per week.  15 mg decreased to 10 mg sq q weeks for the past 6 months or so.  Next dose in 2 days. Insurance approval [employer] - got a robo call 3 days ago,  No abd pain. No diarrhea. Energy is good. Sometimes has an increase in blood just before next dose, but no such syx today. No joint aches. No psoriasis.  Asyx.  Colonoscopy 10/7/21 showed rectal inflammation visually and on bx, sigmoid ulceration visually, and only inflammation on bx, pancolitis-inactive, and left sided pseudopolyps.  So plan to do TDM today and ?????modify dosing. Not enough syx for POWER study.  ======================== 6/28/21  39 yo female for CD f/u. 10 months. Been on Stelara 1.5 years or so.  Doing really well on Stelara. No diarrhea. No pain. Psoriasis almost inactive. No topical. Occasional blood in stool twice a month.  No other medical issues. Last colonoscopy- summer of 2019. CD duration - - - 2017. Need colonoscopy in next 4 months. Is taking omeprazole 40 mg a day for heartburn.  Stelara 90 q 8 Mtx .6 cc or 12.5 mg q week Folic acid 1mg 5 per week Omeprazole 40 mg q am.    ============================ 8/24/20  38 yo female with Crohn's disease for telehealth.  Next 4-5 stools a day. Occasional blood in stool Gets pain 2-3 times a week or more Fatigue No joint aches  Psoriasis is inactive. Stelara - tx.   ==============================  6/22/20  38 yo female with Crohn's disease comes in for face to face 6 month f/u.  Doing the same as Dec 2.  Tapered off Entocort but is back on 6 mg a day.  Started Stelara Sept 2019. Last dose of Cimzia was July 2019 Colonoscopy 7/15/19 and this showed ulceratioon in multiple areas of the colon.  Currently 5 stools a day, pre- Stelara it was 8 and so there is improvement. Urgency is better. Visible blood is gone.  Soaking in a tub and sitz bath and using hemorrhoid, preparation H.  Might try 2 week course of flagyl 500 bid. Culturelle once a day.  Stelara 90 q 8 weeks Mtx .6 q week folic acid 1 mg Entocort 2 per day. Omeprazole 40 mg a day Culturelle Psotiasis has resolved with Stelara. ================================================================= 12/2/19  Plan 12/2/19 omeprazole 40 mg oral capsule,delayed release(DR/EC) SIG: take 1 capsule (40 mg) by oral route once daily before a meal for 90 days DISP: (90) capsules with 3 refills Prescribed on 12/02/2019   Dexilant 60 mg oral capsule,biphase delayed releas SIG: take 1 capsule (60 mg) by oral route once daily DISP: (90) capsules with 1 refills Discontinued on 12/02/2019   Medications have been Reconciled Transition of Care or Provider Policy InstructionsI have documented a list of current medications and reviewed it with the patient. I encouraged the patient to diet and exercise. Electronically Identified Patient Education Materials Provided Electronically DispositionReturn To Clinic in 6 Months CorrespondenceCC this document (Alize Valente MD) - 12/2/2019  3 days of xifaxan given for [place] travel.  Start tapering budesonide from 9 to 6 to 3 mg q month change.        12/2/19 HPI Follow-up Crohn's Disease   History Of Present Illness This is a scheduled appointment for this patient, a 38 year old /White female, after a previous visit approximately Crohn's disease.   37 yo female with CD and recent Cimzia comes in for f/u.  Doing well.  Stooling is more formed. Rare blood in stool.  Big improvement.  Psoriasis is good.  Soreness around bottom. Has used hemorrhoidal cream. Feels irritated    == 8/26/19  37 yo female Stelara infusion 8/5 Less bleeding and for the first week, stools were more formed but now looser and more tarry. Not sure why.  Not on an acid blocker, never was.  No upper pain, but does have lower spasms that is the same as pre-STelara. Urgency is the same.   ====== 6/24/19   37 yo female with CD last seen 5/15 and last had Cimzia 4 days ago, 6/20 and is taking 2 injections -- 400 mg every 2 weeks.  3 loose and 1 formed stool. Painful. Formed stool causes lower abd pain. Rectal bleeding with every stool. Low energy  Been on prednisone 25 a day for 2 months.  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  Psoriasis was dx and confirmed by Dr. Buchanan and she did cortisone injection and is just a nidus on head.  Left knee aches.  Discussed options such as Humira, Remicade and Stelara.  Prefers Budesonide over Prednisone.  5/15/19 labs on pred showed "indeterminate" quantiferon, that we need to recheck.   ==== 5/15/19  37 yo female with CD dx 2007 (initally thought to be UC comes in for 6 month f/u).  Beem pm pred for 5 weeks.  4-5 bloody stools a day.  Urgency.  Never had c diff.  Psotiasis or scaly area on scalp for 4 years, and got better with Cimzia, which was started in 2015.  Cimzia helped CD also, and has escalated to 400 mg q 2 weeks. Mtx .8cc q week. 25mg predniosone   Last dose Cimzia was Monday May 6.  Needs 8 week wash for Julia study which we discussed.  Also stress as she is having divorce in progress.  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.     Past Medical History Disease Name Date Onset Notes Abdominal pain, generalized 06/24/2019 -- Anal pain 12/02/2019 -- Black tarry stools 08/26/2019 -- Crohn disease unk -- Diarrhea 11/28/2018 -- Hematochezia 05/15/2019 -- Influenza Immunization unk -- Psoriasis 11/15/2018 --     Past Surgical History Procedure Name Date Notes Colonoscopy 5/2018 12/02/2019 - 05/15/2019 - 11/28/2018 - 11/15/2018 - 04/11/2018 - 06/17/2016 - 05/06/2016 - 04/11/2016 - EGD 8/2014 12/02/2019 - 05/15/2019 - 11/28/2018 - 11/15/2018 - 04/11/2018 - 06/17/2016 - 05/06/2016 - 04/11/2016 -     Medication List Name Date Started Instructions Dexilant 60 mg oral capsule,biphase delayed releas take 1 capsule (60 mg) by oral route once daily Entocort EC 3 mg oral capsule,delayed,extend.release 06/24/2019 take 3 capsules (9 mg) by oral route once daily in the morning for up to 8 weeks for 30 days folic acid oral -- methotrexate sodium 25 mg/mL injection solution 11/28/2018 inject 1 milliliter once a week for 28 days Stelara 90 mg/mL subcutaneous syringe 06/24/2019 inject 1 milliliter (90 mg) by subcutaneous route every 8 weeks for 60 days     Allergy List Allergen Name Date Reaction Notes No Known Environmental Allergies -- -- 05/06/2016 - 04/11/2016 - Dairy allergy -- -- 05/06/2016 - 04/11/2016 - mercaptopurine -- -- -- other -- -- Mesalamine based drugs     Family Medical History Disease Name Relative/Age Notes Family history of colon polyps Mother/45 5/6/2016 Family history of breast cancer Mother/46 5/6/2016     Social History

## 2025-02-05 ENCOUNTER — TELEPHONE ENCOUNTER (OUTPATIENT)
Dept: URBAN - METROPOLITAN AREA CLINIC 98 | Facility: CLINIC | Age: 45
End: 2025-02-05

## 2025-02-05 ENCOUNTER — P2P PATIENT RECORD (OUTPATIENT)
Age: 45
End: 2025-02-05

## 2025-02-05 RX ORDER — USTEKINUMAB 90 MG/ML
INJECT 90 MG INJECTION, SOLUTION SUBCUTANEOUS
Qty: 1 EACH | Refills: 9
End: 2026-08-19

## 2025-02-14 ENCOUNTER — TELEPHONE ENCOUNTER (OUTPATIENT)
Dept: URBAN - METROPOLITAN AREA CLINIC 98 | Facility: CLINIC | Age: 45
End: 2025-02-14

## 2025-02-14 ENCOUNTER — P2P PATIENT RECORD (OUTPATIENT)
Age: 45
End: 2025-02-14

## 2025-02-14 RX ORDER — USTEKINUMAB 90 MG/ML
INJECT 90 MG INJECTION, SOLUTION SUBCUTANEOUS
Qty: 1 EACH | Refills: 9
End: 2026-08-28

## 2025-03-12 ENCOUNTER — LAB OUTSIDE AN ENCOUNTER (OUTPATIENT)
Dept: URBAN - METROPOLITAN AREA CLINIC 98 | Facility: CLINIC | Age: 45
End: 2025-03-12

## 2025-03-12 ENCOUNTER — OFFICE VISIT (OUTPATIENT)
Dept: URBAN - METROPOLITAN AREA CLINIC 98 | Facility: CLINIC | Age: 45
End: 2025-03-12
Payer: COMMERCIAL

## 2025-03-12 VITALS
HEART RATE: 84 BPM | DIASTOLIC BLOOD PRESSURE: 91 MMHG | TEMPERATURE: 97.3 F | HEIGHT: 67 IN | BODY MASS INDEX: 38.3 KG/M2 | WEIGHT: 244 LBS | SYSTOLIC BLOOD PRESSURE: 140 MMHG

## 2025-03-12 DIAGNOSIS — L40.9 PSORIASIS: ICD-10-CM

## 2025-03-12 DIAGNOSIS — R10.84 ABDOMINAL PAIN, GENERALIZED: ICD-10-CM

## 2025-03-12 DIAGNOSIS — R19.7 DIARRHEA: ICD-10-CM

## 2025-03-12 DIAGNOSIS — Z79.620 LONG TERM (CURRENT) USE OF IMMUNOSUPPRESSIVE BIOLOGIC: ICD-10-CM

## 2025-03-12 DIAGNOSIS — K21.9 ACID REFLUX: ICD-10-CM

## 2025-03-12 DIAGNOSIS — K50.90 CROHN DISEASE: ICD-10-CM

## 2025-03-12 DIAGNOSIS — K62.89 ANAL PAIN: ICD-10-CM

## 2025-03-12 DIAGNOSIS — K92.1 HEMATOCHEZIA: ICD-10-CM

## 2025-03-12 DIAGNOSIS — K50.80 CROHN'S COLITIS: ICD-10-CM

## 2025-03-12 PROCEDURE — 99215 OFFICE O/P EST HI 40 MIN: CPT | Performed by: INTERNAL MEDICINE

## 2025-03-12 RX ORDER — ROSUVASTATIN CALCIUM 40 MG/1
1 TABLET TABLET, COATED ORAL ONCE A DAY
Status: ACTIVE | COMMUNITY

## 2025-03-12 RX ORDER — OMEPRAZOLE 40 MG/1
1 CAPSULE 30 MINUTES BEFORE MORNING MEAL CAPSULE, DELAYED RELEASE ORAL ONCE A DAY
Qty: 90 CAPSULES | Refills: 3 | Status: ACTIVE | COMMUNITY
Start: 2023-03-27

## 2025-03-12 RX ORDER — USTEKINUMAB 90 MG/ML
INJECT 90 MG INJECTION, SOLUTION SUBCUTANEOUS
Qty: 1 EACH | Refills: 9 | Status: ACTIVE | COMMUNITY
End: 2026-08-28

## 2025-03-12 NOTE — HPI-TODAY'S VISIT:
45 yo female with CD dx 2007. No resections. Initially UC and change to CD 2010.  No recent pain diarrhea or bleeding. Next dose of Stelara 3/28 no missed doses.  Sleep apnea, mild, tried CPAP, will likely try no additional tx, did not tolerated cpap well.  No other med issues.  Vz - flu shot and covid.  Has [facility] insurance. Last Stelara level is 2.3, needs recheck (10/3/24) Colonoscopy 3/21/23 showed some right sided ulcers and left sided pseudopolyps and so hopefully at colonoscopy, ulceration is healed.  Will schedule.  Has some intermittent heartburn, largely when she forgets to take the omeprazole 40 mg q am. =============================== 10/9/24  44 yo female with CD on stelara comes in for 7 month f/u and is doing well No pain diarrhea or bleeding. Stelara q 8 weeks.  Continues to benefit from Stelara and should be re-authorized indefinitely at this point. No missed doses but was supposed to be dosed at end of week, so dosed today. They had a hard time drawing blood.  Recent bone density from primary was nl Recent CPAP eval and will be starting it.  ====================== 3/20/24  44 yo female with CD comes in for 11 month f/u.  Doing well. No pain diarrhea or bleeding. 2-3 stools a day. Zero resections.  GERD on omeprazole - -0meprazole has been denied refills but she gets syx if the tx is stopped. Risk/benefit and discussed need for refill and also try otc 20 mg and see if it is strong enough Stelara trough 2.4 - better than prior 1.8 without added dosing =========================== 4/1/7/23  41 yo female returns for 7 moths f/u and 3 weeks after EGd and Colonoscopy. Colon showed inflammatuon of right colon and left colon ulcers were resolved ( from 2021) and changes on bx showed inactive left colon CD. Right colon bx showed patchy inflammation.  Syx wise - she feels well. AGWS. No joint aches.  Stelara re-induction 4/11/22 resulted in resolutuionn of arthralgias and increase of trough level. Did not have much GI syx at that time.  lab review from 3/2/23 shows nl lacto, calpro 91, mild elev of crp 13. Will order trough level before next infusion    =========================== 9/28/22 42 yo female with CD comes in for 5 month f/u. Doing well. Had Stelara re-induction on 4/11/22 and it helped her low trough level. She was not having much in the way of syx and she was having 4 stools a day and now 2 or 3.  no pain or bleeding. No joint or psoriasis.  Blood labs done 2 days ago.  Stelara has helped much more than Cimzia and she is in clinical remission. Last colonoscopy was 10/7/21 and showed left colon ulceration. Needs a f/u- decided on Feb 2023. On Omep so EGD too.  Methotrexate d/c after last visit 10 mg sq q week - - and has done fine off it. ======================== 5/9/22  42 yo female comes in for 2 adrianne f/u. Had re-induction on 4/11/22. Was asyx but was done on basis of low Stelara level and elevated ESR and CRp.  also have tried to stop mtx .4 cc q week. Last dose 4/5/22  Stool frequency or other changes none. Maybe softer.  Getting Stelara every 8 weeks. Next dose is June - sq. 6/6.  Will order trough labs for October- about 6 months after re-induction, and f/u after that.    \========================= 3/9/22  42 yo female dx with CD 2017 and UC 2007 comes in for 2 month interval f/u.  STelara 90 q 8w Methotrexate .4 cc q week Folic acid  Last dose was 1/12 and TDM showed 1.7 uste trough.  no pain. no bleeding no diarrhea energy good. Tiny area of scalp psoriasis.  Will try getting Stelara approved, get trough again and lab    ======================================== 1/10/22  42 yo female with CD dx 2017 on Stelara 90 mg sq q 8 weeks and mtx, now reduced from .6 to .4 per week.  15 mg decreased to 10 mg sq q weeks for the past 6 months or so.  Next dose in 2 days. Insurance approval [employer] - got a robo call 3 days ago,  No abd pain. No diarrhea. Energy is good. Sometimes has an increase in blood just before next dose, but no such syx today. No joint aches. No psoriasis.  Asyx.  Colonoscopy 10/7/21 showed rectal inflammation visually and on bx, sigmoid ulceration visually, and only inflammation on bx, pancolitis-inactive, and left sided pseudopolyps.  So plan to do TDM today and ?????modify dosing. Not enough syx for POWER study.  ======================== 6/28/21  39 yo female for CD f/u. 10 months. Been on Stelara 1.5 years or so.  Doing really well on Stelara. No diarrhea. No pain. Psoriasis almost inactive. No topical. Occasional blood in stool twice a month.  No other medical issues. Last colonoscopy- summer of 2019. CD duration - - - 2017. Need colonoscopy in next 4 months. Is taking omeprazole 40 mg a day for heartburn.  Stelara 90 q 8 Mtx .6 cc or 12.5 mg q week Folic acid 1mg 5 per week Omeprazole 40 mg q am.    ============================ 8/24/20  38 yo female with Crohn's disease for telehealth.  Next 4-5 stools a day. Occasional blood in stool Gets pain 2-3 times a week or more Fatigue No joint aches  Psoriasis is inactive. Stelara - tx.   ==============================  6/22/20  38 yo female with Crohn's disease comes in for face to face 6 month f/u.  Doing the same as Dec 2.  Tapered off Entocort but is back on 6 mg a day.  Started Stelara Sept 2019. Last dose of Cimzia was July 2019 Colonoscopy 7/15/19 and this showed ulceratioon in multiple areas of the colon.  Currently 5 stools a day, pre- Stelara it was 8 and so there is improvement. Urgency is better. Visible blood is gone.  Soaking in a tub and sitz bath and using hemorrhoid, preparation H.  Might try 2 week course of flagyl 500 bid. Culturelle once a day.  Stelara 90 q 8 weeks Mtx .6 q week folic acid 1 mg Entocort 2 per day. Omeprazole 40 mg a day Culturelle Psotiasis has resolved with Stelara. ================================================================= 12/2/19  Plan 12/2/19 omeprazole 40 mg oral capsule,delayed release(DR/EC) SIG: take 1 capsule (40 mg) by oral route once daily before a meal for 90 days DISP: (90) capsules with 3 refills Prescribed on 12/02/2019   Dexilant 60 mg oral capsule,biphase delayed releas SIG: take 1 capsule (60 mg) by oral route once daily DISP: (90) capsules with 1 refills Discontinued on 12/02/2019   Medications have been Reconciled Transition of Care or Provider Policy InstructionsI have documented a list of current medications and reviewed it with the patient. I encouraged the patient to diet and exercise. Electronically Identified Patient Education Materials Provided Electronically DispositionReturn To Clinic in 6 Months CorrespondenceCC this document (Alize Valente MD) - 12/2/2019  3 days of xifaxan given for [place] travel.  Start tapering budesonide from 9 to 6 to 3 mg q month change.        12/2/19 HPI Follow-up Crohn's Disease   History Of Present Illness This is a scheduled appointment for this patient, a 38 year old /White female, after a previous visit approximately Crohn's disease.   37 yo female with CD and recent Cimzia comes in for f/u.  Doing well.  Stooling is more formed. Rare blood in stool.  Big improvement.  Psoriasis is good.  Soreness around bottom. Has used hemorrhoidal cream. Feels irritated    == 8/26/19  37 yo female Stelara infusion 8/5 Less bleeding and for the first week, stools were more formed but now looser and more tarry. Not sure why.  Not on an acid blocker, never was.  No upper pain, but does have lower spasms that is the same as pre-STelara. Urgency is the same.   ====== 6/24/19   37 yo female with CD last seen 5/15 and last had Cimzia 4 days ago, 6/20 and is taking 2 injections -- 400 mg every 2 weeks.  3 loose and 1 formed stool. Painful. Formed stool causes lower abd pain. Rectal bleeding with every stool. Low energy  Been on prednisone 25 a day for 2 months.  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  Psoriasis was dx and confirmed by Dr. Buchanan and she did cortisone injection and is just a nidus on head.  Left knee aches.  Discussed options such as Humira, Remicade and Stelara.  Prefers Budesonide over Prednisone.  5/15/19 labs on pred showed "indeterminate" quantiferon, that we need to recheck.   ==== 5/15/19  37 yo female with CD dx 2007 (initally thought to be UC comes in for 6 month f/u).  Beem pm pred for 5 weeks.  4-5 bloody stools a day.  Urgency.  Never had c diff.  Psotiasis or scaly area on scalp for 4 years, and got better with Cimzia, which was started in 2015.  Cimzia helped CD also, and has escalated to 400 mg q 2 weeks. Mtx .8cc q week. 25mg predniosone   Last dose Cimzia was Monday May 6.  Needs 8 week wash for Julia study which we discussed.  Also stress as she is having divorce in progress.  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     Past Medical History Disease Name Date Onset Notes Abdominal pain, generalized 06/24/2019 -- Anal pain 12/02/2019 -- Black tarry stools 08/26/2019 -- Crohn disease unk -- Diarrhea 11/28/2018 -- Hematochezia 05/15/2019 -- Influenza Immunization unk -- Psoriasis 11/15/2018 --     Past Surgical History Procedure Name Date Notes Colonoscopy 5/2018 12/02/2019 - 05/15/2019 - 11/28/2018 - 11/15/2018 - 04/11/2018 - 06/17/2016 - 05/06/2016 - 04/11/2016 - EGD 8/2014 12/02/2019 - 05/15/2019 - 11/28/2018 - 11/15/2018 - 04/11/2018 - 06/17/2016 - 05/06/2016 - 04/11/2016 -     Medication List Name Date Started Instructions Dexilant 60 mg oral capsule,biphase delayed releas take 1 capsule (60 mg) by oral route once daily Entocort EC 3 mg oral capsule,delayed,extend.release 06/24/2019 take 3 capsules (9 mg) by oral route once daily in the morning for up to 8 weeks for 30 days folic acid oral -- methotrexate sodium 25 mg/mL injection solution 11/28/2018 inject 1 milliliter once a week for 28 days Stelara 90 mg/mL subcutaneous syringe 06/24/2019 inject 1 milliliter (90 mg) by subcutaneous route every 8 weeks for 60 days     Allergy List Allergen Name Date Reaction Notes No Known Environmental Allergies -- -- 05/06/2016 - 04/11/2016 - Dairy allergy -- -- 05/06/2016 - 04/11/2016 - mercaptopurine -- -- -- other -- -- Mesalamine based drugs     Family Medical History Disease Name Relative/Age Notes Family history of colon polyps Mother/45 5/6/2016 Family history of breast cancer Mother/46 5/6/2016     Social History

## 2025-04-15 ENCOUNTER — CLAIMS CREATED FROM THE CLAIM WINDOW (OUTPATIENT)
Dept: URBAN - METROPOLITAN AREA SURGERY CENTER 18 | Facility: SURGERY CENTER | Age: 45
End: 2025-04-15
Payer: COMMERCIAL

## 2025-04-15 ENCOUNTER — CLAIMS CREATED FROM THE CLAIM WINDOW (OUTPATIENT)
Dept: URBAN - METROPOLITAN AREA CLINIC 4 | Facility: CLINIC | Age: 45
End: 2025-04-15
Payer: COMMERCIAL

## 2025-04-15 DIAGNOSIS — K63.89 OTHER SPECIFIED DISEASES OF INTESTINE: ICD-10-CM

## 2025-04-15 DIAGNOSIS — K31.89 FOCAL FOVEOLAR HYPERPLASIA: ICD-10-CM

## 2025-04-15 DIAGNOSIS — K31.89 OTHER DISEASES OF STOMACH AND DUODENUM: ICD-10-CM

## 2025-04-15 DIAGNOSIS — K51.80 ULCERATIVE COLITIS: ICD-10-CM

## 2025-04-15 DIAGNOSIS — K21.9 ACID REFLUX: ICD-10-CM

## 2025-04-15 DIAGNOSIS — K52.3 INDETERMINATE COLITIS: ICD-10-CM

## 2025-04-15 DIAGNOSIS — K51.80 OTHER ULCERATIVE COLITIS WITHOUT COMPLICATIONS: ICD-10-CM

## 2025-04-15 DIAGNOSIS — K63.5 BENIGN COLON POLYP: ICD-10-CM

## 2025-04-15 DIAGNOSIS — K52.3 COLITIS, INDETERMINATE: ICD-10-CM

## 2025-04-15 PROCEDURE — 88313 SPECIAL STAINS GROUP 2: CPT | Performed by: PATHOLOGY

## 2025-04-15 PROCEDURE — 00813 ANES UPR LWR GI NDSC PX: CPT | Performed by: NURSE ANESTHETIST, CERTIFIED REGISTERED

## 2025-04-15 PROCEDURE — 45385 COLONOSCOPY W/LESION REMOVAL: CPT | Performed by: INTERNAL MEDICINE

## 2025-04-15 PROCEDURE — 88342 IMHCHEM/IMCYTCHM 1ST ANTB: CPT | Performed by: PATHOLOGY

## 2025-04-15 PROCEDURE — 45380 COLONOSCOPY AND BIOPSY: CPT | Performed by: INTERNAL MEDICINE

## 2025-04-15 PROCEDURE — 88305 TISSUE EXAM BY PATHOLOGIST: CPT | Performed by: PATHOLOGY

## 2025-04-15 PROCEDURE — 88341 IMHCHEM/IMCYTCHM EA ADD ANTB: CPT | Performed by: PATHOLOGY

## 2025-04-15 PROCEDURE — 43239 EGD BIOPSY SINGLE/MULTIPLE: CPT | Performed by: INTERNAL MEDICINE

## 2025-04-15 PROCEDURE — 88312 SPECIAL STAINS GROUP 1: CPT | Performed by: PATHOLOGY

## 2025-04-15 RX ORDER — USTEKINUMAB 90 MG/ML
INJECT 90 MG INJECTION, SOLUTION SUBCUTANEOUS
Qty: 1 EACH | Refills: 9 | Status: ACTIVE | COMMUNITY
End: 2026-08-28

## 2025-04-15 RX ORDER — OMEPRAZOLE 40 MG/1
1 CAPSULE 30 MINUTES BEFORE MORNING MEAL CAPSULE, DELAYED RELEASE ORAL ONCE A DAY
Qty: 90 CAPSULES | Refills: 3 | Status: ACTIVE | COMMUNITY
Start: 2023-03-27

## 2025-04-15 RX ORDER — ROSUVASTATIN CALCIUM 40 MG/1
1 TABLET TABLET, COATED ORAL ONCE A DAY
Status: ACTIVE | COMMUNITY

## 2025-06-19 ENCOUNTER — P2P PATIENT RECORD (OUTPATIENT)
Age: 45
End: 2025-06-19

## 2025-08-06 ENCOUNTER — TELEPHONE ENCOUNTER (OUTPATIENT)
Dept: URBAN - METROPOLITAN AREA CLINIC 98 | Facility: CLINIC | Age: 45
End: 2025-08-06

## 2025-08-06 RX ORDER — ROSUVASTATIN CALCIUM 40 MG/1
1 TABLET TABLET, FILM COATED ORAL ONCE A DAY
Status: ACTIVE | COMMUNITY

## 2025-08-06 RX ORDER — USTEKINUMAB 90 MG/ML
INJECT 90 MG INJECTION, SOLUTION SUBCUTANEOUS
Qty: 1 EACH | Refills: 9 | Status: DISCONTINUED | COMMUNITY
End: 2026-08-28

## 2025-08-06 RX ORDER — OMEPRAZOLE 40 MG/1
1 CAPSULE 30 MINUTES BEFORE MORNING MEAL CAPSULE, DELAYED RELEASE ORAL ONCE A DAY
Qty: 90 CAPSULES | Refills: 3 | Status: ACTIVE | COMMUNITY
Start: 2023-03-27

## 2025-08-06 RX ORDER — USTEKINUMAB 90 MG/ML
INJECT 90 MG OR INTERCHANGEABLE BIOSIMILAR INJECTION, SOLUTION SUBCUTANEOUS
Qty: 1 EACH | Refills: 9 | OUTPATIENT
Start: 2025-08-06 | End: 2027-02-17

## 2025-08-06 RX ORDER — USTEKINUMAB 90 MG/ML
INJECT 90 MG OR INTERCHANGEABLE BIOSIMILAR INJECTION, SOLUTION SUBCUTANEOUS
Qty: 1 EACH | Refills: 9 | Status: ACTIVE | COMMUNITY
Start: 2025-08-06 | End: 2027-02-17

## 2025-08-12 ENCOUNTER — P2P PATIENT RECORD (OUTPATIENT)
Age: 45
End: 2025-08-12

## 2025-08-12 ENCOUNTER — TELEPHONE ENCOUNTER (OUTPATIENT)
Dept: URBAN - METROPOLITAN AREA CLINIC 98 | Facility: CLINIC | Age: 45
End: 2025-08-12

## 2025-08-12 RX ORDER — USTEKINUMAB 90 MG/ML
INJECT 90 MG OR INTERCHANGEABLE BIOSIMILAR INJECTION, SOLUTION SUBCUTANEOUS
Qty: 1 EACH | Refills: 9
Start: 2025-08-06 | End: 2027-02-23